# Patient Record
Sex: MALE | Race: WHITE | NOT HISPANIC OR LATINO | URBAN - METROPOLITAN AREA
[De-identification: names, ages, dates, MRNs, and addresses within clinical notes are randomized per-mention and may not be internally consistent; named-entity substitution may affect disease eponyms.]

---

## 2020-03-11 ENCOUNTER — OUTPATIENT (OUTPATIENT)
Dept: OUTPATIENT SERVICES | Facility: HOSPITAL | Age: 30
LOS: 1 days | End: 2020-03-11
Payer: COMMERCIAL

## 2020-03-11 VITALS
RESPIRATION RATE: 14 BRPM | HEIGHT: 68 IN | WEIGHT: 136.03 LBS | OXYGEN SATURATION: 99 % | DIASTOLIC BLOOD PRESSURE: 66 MMHG | HEART RATE: 49 BPM | SYSTOLIC BLOOD PRESSURE: 123 MMHG | TEMPERATURE: 98 F

## 2020-03-11 DIAGNOSIS — Z01.818 ENCOUNTER FOR OTHER PREPROCEDURAL EXAMINATION: ICD-10-CM

## 2020-03-11 DIAGNOSIS — Z98.890 OTHER SPECIFIED POSTPROCEDURAL STATES: Chronic | ICD-10-CM

## 2020-03-11 DIAGNOSIS — J34.2 DEVIATED NASAL SEPTUM: ICD-10-CM

## 2020-03-11 DIAGNOSIS — J32.0 CHRONIC MAXILLARY SINUSITIS: ICD-10-CM

## 2020-03-11 DIAGNOSIS — J34.3 HYPERTROPHY OF NASAL TURBINATES: ICD-10-CM

## 2020-03-11 LAB
ANION GAP SERPL CALC-SCNC: 6 MMOL/L — SIGNIFICANT CHANGE UP (ref 5–17)
APTT BLD: 29.2 SEC — SIGNIFICANT CHANGE UP (ref 28.5–37)
BUN SERPL-MCNC: 18 MG/DL — SIGNIFICANT CHANGE UP (ref 7–23)
CALCIUM SERPL-MCNC: 9.5 MG/DL — SIGNIFICANT CHANGE UP (ref 8.5–10.1)
CHLORIDE SERPL-SCNC: 110 MMOL/L — HIGH (ref 96–108)
CO2 SERPL-SCNC: 27 MMOL/L — SIGNIFICANT CHANGE UP (ref 22–31)
CREAT SERPL-MCNC: 0.97 MG/DL — SIGNIFICANT CHANGE UP (ref 0.5–1.3)
GLUCOSE SERPL-MCNC: 68 MG/DL — LOW (ref 70–99)
HCT VFR BLD CALC: 44.6 % — SIGNIFICANT CHANGE UP (ref 39–50)
HGB BLD-MCNC: 15.2 G/DL — SIGNIFICANT CHANGE UP (ref 13–17)
INR BLD: 1.06 RATIO — SIGNIFICANT CHANGE UP (ref 0.88–1.16)
MCHC RBC-ENTMCNC: 30.1 PG — SIGNIFICANT CHANGE UP (ref 27–34)
MCHC RBC-ENTMCNC: 34.1 GM/DL — SIGNIFICANT CHANGE UP (ref 32–36)
MCV RBC AUTO: 88.3 FL — SIGNIFICANT CHANGE UP (ref 80–100)
NRBC # BLD: 0 /100 WBCS — SIGNIFICANT CHANGE UP (ref 0–0)
PLATELET # BLD AUTO: 227 K/UL — SIGNIFICANT CHANGE UP (ref 150–400)
POTASSIUM SERPL-MCNC: 4.5 MMOL/L — SIGNIFICANT CHANGE UP (ref 3.5–5.3)
POTASSIUM SERPL-SCNC: 4.5 MMOL/L — SIGNIFICANT CHANGE UP (ref 3.5–5.3)
PROTHROM AB SERPL-ACNC: 11.9 SEC — SIGNIFICANT CHANGE UP (ref 10–12.9)
RBC # BLD: 5.05 M/UL — SIGNIFICANT CHANGE UP (ref 4.2–5.8)
RBC # FLD: 12.9 % — SIGNIFICANT CHANGE UP (ref 10.3–14.5)
SODIUM SERPL-SCNC: 143 MMOL/L — SIGNIFICANT CHANGE UP (ref 135–145)
WBC # BLD: 6.7 K/UL — SIGNIFICANT CHANGE UP (ref 3.8–10.5)
WBC # FLD AUTO: 6.7 K/UL — SIGNIFICANT CHANGE UP (ref 3.8–10.5)

## 2020-03-11 PROCEDURE — 85730 THROMBOPLASTIN TIME PARTIAL: CPT

## 2020-03-11 PROCEDURE — 85610 PROTHROMBIN TIME: CPT

## 2020-03-11 PROCEDURE — 36415 COLL VENOUS BLD VENIPUNCTURE: CPT

## 2020-03-11 PROCEDURE — G0463: CPT

## 2020-03-11 PROCEDURE — 85027 COMPLETE CBC AUTOMATED: CPT

## 2020-03-11 PROCEDURE — 80048 BASIC METABOLIC PNL TOTAL CA: CPT
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
## 2020-03-11 NOTE — H&P PST ADULT - NSICDXPASTSURGICALHX_GEN_ALL_CORE_FT
PAST SURGICAL HISTORY:  H/O shoulder surgery RIGHT Shoulder - 2011 - "shoulder Stabilization Surgery"

## 2020-03-11 NOTE — H&P PST ADULT - NSICDXPROBLEM_GEN_ALL_CORE_FT
PROBLEM DIAGNOSES  Problem: Deviated nasal septum  Assessment and Plan:     Problem: Chronic maxillary sinusitis  Assessment and Plan: See Above Assessment and Plan (#1)    Problem: Hypertrophy of nasal turbinates  Assessment and Plan: See Above Assessment and Plan (#1) PROBLEM DIAGNOSES  Problem: Deviated nasal septum  Assessment and Plan: PST Labs; CBC, BMP, Coags - No Medical Clearance needed - Pt instructed to stop any NSAIDS/Herbal Supplements/Multivitamin between now and procedure - he may take Tylenol if needed for pain between now and procedure - pre-op instructions given to pt with understanding verbalized     Problem: Chronic maxillary sinusitis  Assessment and Plan: See Above Assessment and Plan (#1)    Problem: Hypertrophy of nasal turbinates  Assessment and Plan: See Above Assessment and Plan (#1)

## 2020-03-11 NOTE — H&P PST ADULT - NSICDXFAMILYHX_GEN_ALL_CORE_FT
FAMILY HISTORY:  Family history of hypercholesterolemia, Father - Alive Age 58  Family hx of hypertension, Father - Alive Age 58

## 2021-07-19 ENCOUNTER — EMERGENCY (EMERGENCY)
Facility: HOSPITAL | Age: 31
LOS: 1 days | Discharge: HOME | End: 2021-07-19
Attending: EMERGENCY MEDICINE | Admitting: EMERGENCY MEDICINE
Payer: COMMERCIAL

## 2021-07-19 ENCOUNTER — TRANSCRIPTION ENCOUNTER (OUTPATIENT)
Age: 31
End: 2021-07-19

## 2021-07-19 VITALS
HEART RATE: 64 BPM | DIASTOLIC BLOOD PRESSURE: 60 MMHG | SYSTOLIC BLOOD PRESSURE: 120 MMHG | OXYGEN SATURATION: 100 % | RESPIRATION RATE: 16 BRPM

## 2021-07-19 VITALS
DIASTOLIC BLOOD PRESSURE: 77 MMHG | RESPIRATION RATE: 16 BRPM | HEIGHT: 68 IN | TEMPERATURE: 98 F | OXYGEN SATURATION: 100 % | HEART RATE: 63 BPM | SYSTOLIC BLOOD PRESSURE: 129 MMHG | WEIGHT: 132.06 LBS

## 2021-07-19 DIAGNOSIS — Z98.890 OTHER SPECIFIED POSTPROCEDURAL STATES: Chronic | ICD-10-CM

## 2021-07-19 PROBLEM — J34.2 DEVIATED NASAL SEPTUM: Chronic | Status: ACTIVE | Noted: 2020-03-11

## 2021-07-19 PROBLEM — J34.3 HYPERTROPHY OF NASAL TURBINATES: Chronic | Status: ACTIVE | Noted: 2020-03-11

## 2021-07-19 PROBLEM — J32.0 CHRONIC MAXILLARY SINUSITIS: Chronic | Status: ACTIVE | Noted: 2020-03-11

## 2021-07-19 PROBLEM — R00.1 BRADYCARDIA, UNSPECIFIED: Chronic | Status: ACTIVE | Noted: 2020-03-11

## 2021-07-19 PROBLEM — S43.006A UNSPECIFIED DISLOCATION OF UNSPECIFIED SHOULDER JOINT, INITIAL ENCOUNTER: Chronic | Status: ACTIVE | Noted: 2020-03-11

## 2021-07-19 LAB
ALBUMIN SERPL ELPH-MCNC: 4.1 G/DL — SIGNIFICANT CHANGE UP (ref 3.3–5)
ALP SERPL-CCNC: 63 U/L — SIGNIFICANT CHANGE UP (ref 40–120)
ALT FLD-CCNC: 42 U/L — SIGNIFICANT CHANGE UP (ref 12–78)
ANION GAP SERPL CALC-SCNC: 6 MMOL/L — SIGNIFICANT CHANGE UP (ref 5–17)
AST SERPL-CCNC: 23 U/L — SIGNIFICANT CHANGE UP (ref 15–37)
BASOPHILS # BLD AUTO: 0.03 K/UL — SIGNIFICANT CHANGE UP (ref 0–0.2)
BASOPHILS NFR BLD AUTO: 0.4 % — SIGNIFICANT CHANGE UP (ref 0–2)
BILIRUB SERPL-MCNC: 0.9 MG/DL — SIGNIFICANT CHANGE UP (ref 0.2–1.2)
BUN SERPL-MCNC: 13 MG/DL — SIGNIFICANT CHANGE UP (ref 7–23)
CALCIUM SERPL-MCNC: 9 MG/DL — SIGNIFICANT CHANGE UP (ref 8.5–10.1)
CHLORIDE SERPL-SCNC: 108 MMOL/L — SIGNIFICANT CHANGE UP (ref 96–108)
CO2 SERPL-SCNC: 28 MMOL/L — SIGNIFICANT CHANGE UP (ref 22–31)
CREAT SERPL-MCNC: 0.78 MG/DL — SIGNIFICANT CHANGE UP (ref 0.5–1.3)
EOSINOPHIL # BLD AUTO: 0.4 K/UL — SIGNIFICANT CHANGE UP (ref 0–0.5)
EOSINOPHIL NFR BLD AUTO: 5 % — SIGNIFICANT CHANGE UP (ref 0–6)
GLUCOSE SERPL-MCNC: 79 MG/DL — SIGNIFICANT CHANGE UP (ref 70–99)
HCT VFR BLD CALC: 46.5 % — SIGNIFICANT CHANGE UP (ref 39–50)
HGB BLD-MCNC: 15.5 G/DL — SIGNIFICANT CHANGE UP (ref 13–17)
IMM GRANULOCYTES NFR BLD AUTO: 0.1 % — SIGNIFICANT CHANGE UP (ref 0–1.5)
LACTATE SERPL-SCNC: 1.3 MMOL/L — SIGNIFICANT CHANGE UP (ref 0.7–2)
LYMPHOCYTES # BLD AUTO: 2.5 K/UL — SIGNIFICANT CHANGE UP (ref 1–3.3)
LYMPHOCYTES # BLD AUTO: 31.2 % — SIGNIFICANT CHANGE UP (ref 13–44)
MCHC RBC-ENTMCNC: 30 PG — SIGNIFICANT CHANGE UP (ref 27–34)
MCHC RBC-ENTMCNC: 33.3 GM/DL — SIGNIFICANT CHANGE UP (ref 32–36)
MCV RBC AUTO: 89.9 FL — SIGNIFICANT CHANGE UP (ref 80–100)
MONOCYTES # BLD AUTO: 0.53 K/UL — SIGNIFICANT CHANGE UP (ref 0–0.9)
MONOCYTES NFR BLD AUTO: 6.6 % — SIGNIFICANT CHANGE UP (ref 2–14)
NEUTROPHILS # BLD AUTO: 4.54 K/UL — SIGNIFICANT CHANGE UP (ref 1.8–7.4)
NEUTROPHILS NFR BLD AUTO: 56.7 % — SIGNIFICANT CHANGE UP (ref 43–77)
NRBC # BLD: 0 /100 WBCS — SIGNIFICANT CHANGE UP (ref 0–0)
PLATELET # BLD AUTO: 189 K/UL — SIGNIFICANT CHANGE UP (ref 150–400)
POTASSIUM SERPL-MCNC: 3.9 MMOL/L — SIGNIFICANT CHANGE UP (ref 3.5–5.3)
POTASSIUM SERPL-SCNC: 3.9 MMOL/L — SIGNIFICANT CHANGE UP (ref 3.5–5.3)
PROT SERPL-MCNC: 7.8 G/DL — SIGNIFICANT CHANGE UP (ref 6–8.3)
RBC # BLD: 5.17 M/UL — SIGNIFICANT CHANGE UP (ref 4.2–5.8)
RBC # FLD: 13 % — SIGNIFICANT CHANGE UP (ref 10.3–14.5)
SODIUM SERPL-SCNC: 142 MMOL/L — SIGNIFICANT CHANGE UP (ref 135–145)
WBC # BLD: 8.01 K/UL — SIGNIFICANT CHANGE UP (ref 3.8–10.5)
WBC # FLD AUTO: 8.01 K/UL — SIGNIFICANT CHANGE UP (ref 3.8–10.5)

## 2021-07-19 PROCEDURE — 85025 COMPLETE CBC W/AUTO DIFF WBC: CPT

## 2021-07-19 PROCEDURE — 87040 BLOOD CULTURE FOR BACTERIA: CPT

## 2021-07-19 PROCEDURE — 80053 COMPREHEN METABOLIC PANEL: CPT

## 2021-07-19 PROCEDURE — 99284 EMERGENCY DEPT VISIT MOD MDM: CPT

## 2021-07-19 PROCEDURE — 96374 THER/PROPH/DIAG INJ IV PUSH: CPT

## 2021-07-19 PROCEDURE — 36415 COLL VENOUS BLD VENIPUNCTURE: CPT

## 2021-07-19 PROCEDURE — 99284 EMERGENCY DEPT VISIT MOD MDM: CPT | Mod: 25

## 2021-07-19 PROCEDURE — 83605 ASSAY OF LACTIC ACID: CPT

## 2021-07-19 RX ORDER — PIPERACILLIN AND TAZOBACTAM 4; .5 G/20ML; G/20ML
3.38 INJECTION, POWDER, LYOPHILIZED, FOR SOLUTION INTRAVENOUS ONCE
Refills: 0 | Status: COMPLETED | OUTPATIENT
Start: 2021-07-19 | End: 2021-07-19

## 2021-07-19 RX ORDER — AZTREONAM 2 G
1 VIAL (EA) INJECTION
Qty: 6 | Refills: 0
Start: 2021-07-19 | End: 2021-07-21

## 2021-07-19 RX ADMIN — PIPERACILLIN AND TAZOBACTAM 200 GRAM(S): 4; .5 INJECTION, POWDER, LYOPHILIZED, FOR SOLUTION INTRAVENOUS at 14:35

## 2021-07-19 NOTE — ED PROVIDER NOTE - PATIENT PORTAL LINK FT
You can access the FollowMyHealth Patient Portal offered by [facility] by registering at the following website: [email or web address]. By joining [employer]’s FollowMyHealth portal, you will also be able to view your health information using other applications (apps) compatible with our system.

## 2021-07-19 NOTE — ED PROVIDER NOTE - ATTENDING CONTRIBUTION TO CARE
32 yo M p/w recent poison ivy to R knee. Pt states was healing well , now over past couple days, increased redness and discomfort to knee. Now with some feeling of prox spread. No fever/chills. no weakness / dizziness. no numbness / focal weakness. No pain to knee, no issues with moving knee / leg. No known covid exposures. no agg/allev factors. no other inj or co.  exam: MM Moist.  neck supple. no meningeal signs. Non-toxic, well appearing. R ant knee with diffuse erythema / warmth with some spread to distal upper leg. No LA palpated. Nl dist str/sens equal bl, FROM knee with NO pain. Nl cap refill, 2+ pulses. no other acute findings.  check labs, IV abx, outpt fu

## 2021-07-19 NOTE — ED PROVIDER NOTE - NSFOLLOWUPINSTRUCTIONS_ED_ALL_ED_FT
Cellulitis    Cellulitis is a skin infection caused by bacteria. This condition occurs most often in the arms and lower legs but can occur anywhere over the body. Symptoms include redness, swelling, warm skin, tenderness, and chills/fever. If you were prescribed an antibiotic medicine, take it as told by your health care provider. Do not stop taking the antibiotic even if you start to feel better.    SEEK IMMEDIATE MEDICAL CARE IF YOU HAVE ANY OF THE FOLLOWING SYMPTOMS: worsening fever, red streaks coming from affected area, vomiting or diarrhea, or dizziness/lightheadedness.      follow up with dermatologist and PMD  if condition worsens, or you develop fever return to ED

## 2021-07-19 NOTE — ED PROVIDER NOTE - PMH
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

## 2021-07-19 NOTE — ED ADULT NURSE NOTE - OBJECTIVE STATEMENT
Received the patient in the Er. patient is alert and oriented. Skin warm and dry. C/O redness and pain to right knee. Patient was on PO antibiotics.

## 2021-07-19 NOTE — ED PROVIDER NOTE - CLINICAL SUMMARY MEDICAL DECISION MAKING FREE TEXT BOX
right anterior knee cellulitis, hx of poison ivy 1 week ago, no fever, seen at urgent care today, prescribed abx , sent to ED for evaluation and iv abx, will obtain labs, give iv abx, follow up with dermatology and pmd

## 2021-07-19 NOTE — ED PROVIDER NOTE - CARE PROVIDER_API CALL
Valencia Duarte)  Internal Medicine  [address] [place] [address]  Phone: [phone]  Fax: [phone]  Follow Up Time: 4-6 Days

## 2021-07-19 NOTE — ED PROVIDER NOTE - CARE PLAN
Principal Discharge DX:	Poison ivy dermatitis  Secondary Diagnosis:	Cellulitis of right lower extremity

## 2021-07-19 NOTE — ED ADULT NURSE NOTE - NSIMPLEMENTINTERV_GEN_ALL_ED
Implemented All Universal Safety Interventions:  [place] to call system. Call bell, personal items and telephone within reach. Instruct patient to call for assistance. Room bathroom lighting operational. Non-slip footwear when patient is off stretcher. Physically safe environment: no spills, clutter or unnecessary equipment. Stretcher in lowest position, wheels locked, appropriate side rails in place.

## 2021-07-19 NOTE — ED PROVIDER NOTE - OBJECTIVE STATEMENT
31 y male presents with right knee cellulitis, with faint streaking and tenderness to right shin, and right thigh, no fever, states he had a case of poison ivy 1.5 weeks ago, seen at urgent care today, was sent to ED for evaluation, states he was prescribed po abx today but does not know the name.  nonsmoker, no etoh.  states no pmh, no PMD

## 2021-07-19 NOTE — ED PROVIDER NOTE - NSFOLLOWUPCLINICS_GEN_ALL_ED_FT
[facility] - [facility]  Dermatology  [address] [place] [address]  Phone: [phone]  Fax: [phone]  Established Patient  Follow Up Time: 1-3 Days

## 2021-07-19 NOTE — ED ADULT TRIAGE NOTE - CHIEF COMPLAINT QUOTE
"I had poison ivy and scratched it and I guess it got infected."  pt c/o wound and redness to right knee now extending up to groin, pt took one dose of oral antibiotics prescribed this morning by urgent care

## 2021-07-24 LAB
CULTURE RESULTS: SIGNIFICANT CHANGE UP
CULTURE RESULTS: SIGNIFICANT CHANGE UP
SPECIMEN SOURCE: SIGNIFICANT CHANGE UP
SPECIMEN SOURCE: SIGNIFICANT CHANGE UP

## 2022-01-11 ENCOUNTER — NON-APPOINTMENT (OUTPATIENT)
Age: 32
End: 2022-01-11

## 2022-01-11 ENCOUNTER — APPOINTMENT (OUTPATIENT)
Dept: ORTHOPEDIC SURGERY | Facility: CLINIC | Age: 32
End: 2022-01-11
Payer: COMMERCIAL

## 2022-01-11 PROCEDURE — 73564 X-RAY EXAM KNEE 4 OR MORE: CPT | Mod: LT

## 2022-01-11 PROCEDURE — 99204 OFFICE O/P NEW MOD 45 MIN: CPT

## 2022-01-11 NOTE — HISTORY OF PRESENT ILLNESS
[Stable] : stable [___ mths] : [unfilled] month(s) ago [3] : an average pain level of 3/10 [2] : a minimum pain level of 2/10 [4] : a maximum pain level of 4/10 [Bending] : worsened by bending [Running] : worsened by running [Physical Therapy] : relieved by physical therapy [Rest] : relieved by rest [de-identified] : CARLOTTA BEAUCHAMP is a 31 year male being seen for initial visit L knee pain. He reports he was previously being treated by another orthopedic for his knee pain until they stopped accepting his insurance. He reports he received a L knee cortisone injection in Sept 2021 that provided him 85% relief. He reports he also performed 4-6 weeks of physical therapy with some relief, and continues to perform his exercises at home. He reports he was set to receive a gel injection with his last doctor (Dr. Mariah William), but was unable to receive injection before they stopped accepting his insurance. Currently, he reports most pain after performing repetitive activity or bending. He reports most pain over proximal-lateral aspect of L patella. He reports relief with IT band stretching and massage. 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 \par Patient presents with MRI of L knee performed on 09/10/2021 at [facility]. MRI reveals:  mis hele mess\par \par 1. Few ganglion cysts at the proximal tibia and fibula articulation, consistent with degenerative change.\par 2. Subtle full-thickness cartilage fissure within the lateral patellar facet cartilage with associated subchondral edema.\par 3. Small Baker cyst.\par 4. Menisci and cruciate ligaments are intact.

## 2022-01-11 NOTE — ADDENDUM
[FreeTextEntry1] : Documented by Chad Enriquez acting as a scribe for Dr. Garcias on 01/11/2022. \par \par All medical record entries made by the Scribe were at my, Dr. Garcias's, direction and\par personally dictated by me on 01/11/2022. I have reviewed the chart and agree that the record\par accurately reflects my personal performance of the history, physical exam, procedure and imaging.

## 2022-01-11 NOTE — PHYSICAL EXAM
[de-identified] : Physical Exam:\par General: Well appearing, no acute distress, A&O\par Neurologic: A&Ox3, No focal deficits\par Head: NCAT without abrasions, lacerations, or ecchymosis to head, face, or scalp\par Eyes: No scleral icterus, no gross abnormalities\par Respiratory: Equal chest wall expansion bilaterally, no accessory muscle use\par Lymphatic: No lymphadenopathy palpated\par Skin: Warm and dry\par Psychiatric: Normal mood and affect\par \par Left Knee: Range of Motion in Degrees	\par 	  Claimant:	Normal:	\par Flexion Active	 135 	 135-degrees	\par Flexion Passive	 135	 135-degrees	\par Extension Active	 0-5	 0-5-degrees	\par Extension Passive	 0-5	 0-5-degrees	\par \par Tenderness over the tibial tubercle. No tenderness over the tendon at this time. No weakness to flexion/extension. Tenderness over the pes bursa. Small baker's cyst noted. No evidence of instability in the AP plane or varus or valgus stress. Negative Lachman. Negative pivot shift. Negative anterior drawer test. Negative posterior drawer test. Negative Adilson. Negative Apley grind. No medial or lateral joint line tenderness. No tenderness over the medial and lateral facet of the patella. No patellofemoral crepitations. No lateral tilting patella. No patella apprehension. No crepitation in the medial and lateral femoral condyle. No proximal or distal swelling, edema or tenderness. No gross motor or sensory deficits. No intra-articular swelling. Extra-articular swelling over the proximal medial aspect of the tibia. 2+ DP and PT pulses. No varus or valgus malalignment. Skin is intact. No rashes, scars or lesions. \par  \par Right Knee: Range of Motion in Degrees	\par 	  Claimant:	Normal:	\par Flexion Active	 135 	 135-degrees	\par Flexion Passive	 135	 135-degrees	\par Extension Active	 0-5	 0-5-degrees	\par Extension Passive	 0-5	 0-5-degrees	\par \par No weakness to flexion/extension. No evidence of instability in the AP plane or varus or valgus stress. Negative Lachman. Negative pivot shift. Negative anterior drawer test. Negative posterior drawer test. Negative Adilson. Negative Apley grind. No medial or lateral joint line tenderness. No tenderness over the medial and lateral facet of the patella. No patellofemoral crepitations. No lateral tilting patella. No patellar apprehension. No crepitation in the medial and lateral femoral condyle. No proximal or distal swelling, edema or tenderness. No gross motor or sensory deficits. No intra-articular swelling. 2+ DP and PT pulses. No varus or valgus malalignment. Skin is intact. No rashes, scars or lesions.  [de-identified] : 4 views of L knee were performed today and available for me to review. Results were discussed with the patient. They demonstrate patellar edema, mild to moderate PF joint space narrowing no f/x, dislocation or other deformity.\par \par DATE OF EXAM: 09/10/2021 - [facility]\par MRI-3T LEFT KNEE NON CONTRAST\par IMPRESSION:\par \par 1. Few ganglion cysts at the proximal tibia and fibula articulation, consistent with degenerative change.\par 2. Subtle full-thickness cartilage fissure within the lateral patellar facet cartilage with associated subchondral edema.\par 3. Small Baker cyst.\par 4. Menisci and cruciate ligaments are intact.

## 2022-01-11 NOTE — DISCUSSION/SUMMARY
[de-identified] : CARLOTTA is a 31 year male with Left knee pain secondary to IT band tendinitis and patellofemoral pain syndrome. I have explained to the patient the anatomy of the patellofemoral joint. It includes the extensor mechanism (quadriceps tendon, quadriceps muscles, patella, patella tendon, and medial and lateral retinaculum). I have shown the patient that the articular cartilage gets a little softened. This is what is known as chondromalacia. If one theoretically were to remove or scrape all the articular cartilage, it would be identified as arthritis. Somewhat paradoxically, however, chondromalacia does not necessarily lead to arthritis or at least there is no evidence irrefutable at this point in time.\par \par Chondromalacia or anterior knee pain is a syndrome that hurts the patients more than it causes destruction to the knee; thus pain is not associated with destruction. Likewise, noises, cracking, and popping for the most part are not anymore significant than people cracking their knuckles. It is certainly not a sign of damage to the joint.\par \par Over 90% of the people with patellofemoral problems will get better if they understand the weight bearing stresses that are applied to the patellofemoral joint. The forces can range from 2 to 9 times your body weight per step and with abnormal alignment the average is usually higher. \par \par The treatment is to minimize weight-bearing stress and to strengthen the surrounding muscles. From a practical point of view, one can divide their lifestyle into activities of daily living, conditioning, and recreation. In activities of daily living one should feel free to walk around as necessary but only for functioning. If one has an option between stairs and an escalator, the latter is preferable.\par \par The conditioning aspect should be a non weight bearing program which is best achieved by bicycle riding at least 3 to 4 days a week. It should be done with increasing resistance for at least a half hour. The seat of the bike should always be kept at a position so that the knee stays within a 0 to 90 degree arc. This will avoid hyperextension and flexion beyond 90 degrees, which tends to aggravate symptoms of discomfort. Leg extension for stretching exercises are similarly kept within this arc of motion. Step machines are not advised as they tend to aggravate patellofemoral symptoms as do squats. A [employer] machine is an alternative that is usually acceptable.\par \par The recreational part of their life is based on the fact that since pain is not leading to destruction, we will compromise and allow a recreational lifestyle. If indeed activity, albeit associated with impact does not yield any symptoms, they should feel free to participate. If an increase in activities is associated with increasing discomfort, the patient should use "common sense" and cut back on the level of activity. Recreation, however, is never to be used for conditioning even in an asymptomatic patient. The patient must always maintain a conditioning program. I think the patient understands this explanation.\par \par While over 90% of the people with this syndrome will do well non-operatively, some conscientious patients will still have symptoms. If so, they should be reevaluated to ascertain if they fall into a small category of people who require physical therapy or surgery. I also advised him about hip, lower body mobility exercises. \par \par We ordered Euflexxa and Zilretta to see which would be approved by patients' insurance. Pt understands Euflexxa is a three part series and he would have to come in in 1 week intervals over a 3 week timeframe, if approved. He also understands this due to his insurance requires prior authorization. We will call pt when and if approved. If he does not hear from us in 2 weeks he should give his insurance company a call and discuss approval or denial and then get in contact with us. \par \par \par

## 2022-02-23 ENCOUNTER — OUTPATIENT (OUTPATIENT)
Dept: OUTPATIENT SERVICES | Facility: HOSPITAL | Age: 32
LOS: 1 days | End: 2022-02-23
Payer: COMMERCIAL

## 2022-02-23 VITALS
SYSTOLIC BLOOD PRESSURE: 131 MMHG | WEIGHT: 130.95 LBS | HEART RATE: 55 BPM | HEIGHT: 68 IN | DIASTOLIC BLOOD PRESSURE: 75 MMHG | TEMPERATURE: 98 F | RESPIRATION RATE: 17 BRPM | OXYGEN SATURATION: 99 %

## 2022-02-23 DIAGNOSIS — J34.3 HYPERTROPHY OF NASAL TURBINATES: ICD-10-CM

## 2022-02-23 DIAGNOSIS — Z01.818 ENCOUNTER FOR OTHER PREPROCEDURAL EXAMINATION: ICD-10-CM

## 2022-02-23 DIAGNOSIS — J34.2 DEVIATED NASAL SEPTUM: ICD-10-CM

## 2022-02-23 DIAGNOSIS — Z98.890 OTHER SPECIFIED POSTPROCEDURAL STATES: Chronic | ICD-10-CM

## 2022-02-23 LAB
APTT BLD: 28 SEC — SIGNIFICANT CHANGE UP (ref 27.5–35.5)
HCT VFR BLD CALC: 44.4 % — SIGNIFICANT CHANGE UP (ref 39–50)
HGB BLD-MCNC: 15.7 G/DL — SIGNIFICANT CHANGE UP (ref 13–17)
INR BLD: 1.02 RATIO — SIGNIFICANT CHANGE UP (ref 0.88–1.16)
MCHC RBC-ENTMCNC: 30.4 PG — SIGNIFICANT CHANGE UP (ref 27–34)
MCHC RBC-ENTMCNC: 35.4 GM/DL — SIGNIFICANT CHANGE UP (ref 32–36)
MCV RBC AUTO: 85.9 FL — SIGNIFICANT CHANGE UP (ref 80–100)
NRBC # BLD: 0 /100 WBCS — SIGNIFICANT CHANGE UP (ref 0–0)
PLATELET # BLD AUTO: 213 K/UL — SIGNIFICANT CHANGE UP (ref 150–400)
PROTHROM AB SERPL-ACNC: 11.9 SEC — SIGNIFICANT CHANGE UP (ref 10.6–13.6)
RBC # BLD: 5.17 M/UL — SIGNIFICANT CHANGE UP (ref 4.2–5.8)
RBC # FLD: 13.2 % — SIGNIFICANT CHANGE UP (ref 10.3–14.5)
WBC # BLD: 6.59 K/UL — SIGNIFICANT CHANGE UP (ref 3.8–10.5)
WBC # FLD AUTO: 6.59 K/UL — SIGNIFICANT CHANGE UP (ref 3.8–10.5)

## 2022-02-23 PROCEDURE — 85610 PROTHROMBIN TIME: CPT

## 2022-02-23 PROCEDURE — 85730 THROMBOPLASTIN TIME PARTIAL: CPT

## 2022-02-23 PROCEDURE — 85027 COMPLETE CBC AUTOMATED: CPT

## 2022-02-23 PROCEDURE — G0463: CPT

## 2022-02-23 PROCEDURE — 36415 COLL VENOUS BLD VENIPUNCTURE: CPT

## 2022-02-23 NOTE — H&P PST ADULT - FALL HARM RISK - RISK INTERVENTIONS

## 2022-02-23 NOTE — H&P PST ADULT - NSANTHOSAYNRD_GEN_A_CORE
No. JALEESA screening performed.  STOP BANG Legend: 0-2 = LOW Risk; 3-4 = INTERMEDIATE Risk; 5-8 = HIGH Risk

## 2022-02-23 NOTE — H&P PST ADULT - PROBLEM SELECTOR PLAN 1
PST: CBC, PT/INR PTT   No medical evaluation needed   All preoperative instructions reviewed with patient who verbalized understanding.   Avoid all NSAID/ASA containing products as well as all herbal/vitamin supplements. Tylenol only for pain/headache.   Covid swab at [place] date TBD. Pt verbalized understanding.   Fully vaccinated; Denies recent international travel, recent illness and sick contact with COVID in the last 3 weeks

## 2022-02-23 NOTE — H&P PST ADULT - ASSESSMENT
This 32 yo male with no significant PMHX  presents to PST for a scheduled  septoplasty bilateral submucous resection turbinates with Dr Charles  on 3/2/22

## 2022-02-23 NOTE — H&P PST ADULT - HISTORY OF PRESENT ILLNESS
This 32 yo male with no significant PMHX  presents to PST for a scheduled  septoplasty bilateral submucous resection turbinates with Dr Charles  on 3/2/22  . Pt is electing to have this procedure.

## 2022-02-28 ENCOUNTER — OUTPATIENT (OUTPATIENT)
Dept: OUTPATIENT SERVICES | Facility: HOSPITAL | Age: 32
LOS: 1 days | End: 2022-02-28
Payer: COMMERCIAL

## 2022-02-28 DIAGNOSIS — Z20.828 CONTACT WITH AND (SUSPECTED) EXPOSURE TO OTHER VIRAL COMMUNICABLE DISEASES: ICD-10-CM

## 2022-02-28 DIAGNOSIS — Z98.890 OTHER SPECIFIED POSTPROCEDURAL STATES: Chronic | ICD-10-CM

## 2022-02-28 LAB — SARS-COV-2 RNA SPEC QL NAA+PROBE: SIGNIFICANT CHANGE UP

## 2022-02-28 PROCEDURE — U0003: CPT

## 2022-02-28 PROCEDURE — U0005: CPT

## 2022-03-01 ENCOUNTER — TRANSCRIPTION ENCOUNTER (OUTPATIENT)
Age: 32
End: 2022-03-01

## 2022-03-01 ENCOUNTER — APPOINTMENT (OUTPATIENT)
Dept: ORTHOPEDIC SURGERY | Facility: CLINIC | Age: 32
End: 2022-03-01
Payer: COMMERCIAL

## 2022-03-01 VITALS
HEIGHT: 69 IN | HEART RATE: 74 BPM | WEIGHT: 135 LBS | SYSTOLIC BLOOD PRESSURE: 111 MMHG | BODY MASS INDEX: 19.99 KG/M2 | DIASTOLIC BLOOD PRESSURE: 62 MMHG

## 2022-03-01 PROCEDURE — 20610 DRAIN/INJ JOINT/BURSA W/O US: CPT | Mod: LT

## 2022-03-01 NOTE — ASU PATIENT PROFILE, ADULT - FALL HARM RISK - RISK INTERVENTIONS

## 2022-03-02 ENCOUNTER — RESULT REVIEW (OUTPATIENT)
Age: 32
End: 2022-03-02

## 2022-03-02 ENCOUNTER — OUTPATIENT (OUTPATIENT)
Dept: OUTPATIENT SERVICES | Facility: HOSPITAL | Age: 32
LOS: 1 days | End: 2022-03-02
Payer: COMMERCIAL

## 2022-03-02 VITALS
RESPIRATION RATE: 16 BRPM | SYSTOLIC BLOOD PRESSURE: 122 MMHG | HEART RATE: 60 BPM | TEMPERATURE: 98 F | DIASTOLIC BLOOD PRESSURE: 70 MMHG | OXYGEN SATURATION: 98 % | WEIGHT: 134.92 LBS | HEIGHT: 68 IN

## 2022-03-02 VITALS
SYSTOLIC BLOOD PRESSURE: 135 MMHG | OXYGEN SATURATION: 97 % | RESPIRATION RATE: 16 BRPM | HEART RATE: 55 BPM | DIASTOLIC BLOOD PRESSURE: 83 MMHG

## 2022-03-02 DIAGNOSIS — J34.3 HYPERTROPHY OF NASAL TURBINATES: ICD-10-CM

## 2022-03-02 DIAGNOSIS — Z01.818 ENCOUNTER FOR OTHER PREPROCEDURAL EXAMINATION: ICD-10-CM

## 2022-03-02 DIAGNOSIS — Z98.890 OTHER SPECIFIED POSTPROCEDURAL STATES: Chronic | ICD-10-CM

## 2022-03-02 DIAGNOSIS — J34.2 DEVIATED NASAL SEPTUM: ICD-10-CM

## 2022-03-02 PROCEDURE — 88304 TISSUE EXAM BY PATHOLOGIST: CPT | Mod: 26

## 2022-03-02 PROCEDURE — C1889: CPT

## 2022-03-02 PROCEDURE — 88311 DECALCIFY TISSUE: CPT | Mod: 26

## 2022-03-02 PROCEDURE — 88304 TISSUE EXAM BY PATHOLOGIST: CPT

## 2022-03-02 PROCEDURE — 88311 DECALCIFY TISSUE: CPT

## 2022-03-02 PROCEDURE — 30140 RESECT INFERIOR TURBINATE: CPT | Mod: 50

## 2022-03-02 PROCEDURE — 88300 SURGICAL PATH GROSS: CPT

## 2022-03-02 PROCEDURE — 88300 SURGICAL PATH GROSS: CPT | Mod: 26,59

## 2022-03-02 PROCEDURE — 30520 REPAIR OF NASAL SEPTUM: CPT

## 2022-03-02 DEVICE — PACKING NASAL MEROGEL 4X4CM: Type: IMPLANTABLE DEVICE | Status: FUNCTIONAL

## 2022-03-02 DEVICE — SHEET SILICONE .040-1.02 MM: Type: IMPLANTABLE DEVICE | Status: FUNCTIONAL

## 2022-03-02 RX ORDER — HYDROMORPHONE HYDROCHLORIDE 2 MG/ML
1 INJECTION INTRAMUSCULAR; INTRAVENOUS; SUBCUTANEOUS
Refills: 0 | Status: DISCONTINUED | OUTPATIENT
Start: 2022-03-02 | End: 2022-03-02

## 2022-03-02 RX ORDER — OXYCODONE AND ACETAMINOPHEN 5; 325 MG/1; MG/1
1 TABLET ORAL
Qty: 28 | Refills: 0
Start: 2022-03-02 | End: 2022-03-08

## 2022-03-02 RX ORDER — SODIUM CHLORIDE 9 MG/ML
1000 INJECTION, SOLUTION INTRAVENOUS
Refills: 0 | Status: DISCONTINUED | OUTPATIENT
Start: 2022-03-02 | End: 2022-03-02

## 2022-03-02 RX ORDER — HYDROMORPHONE HYDROCHLORIDE 2 MG/ML
0.5 INJECTION INTRAMUSCULAR; INTRAVENOUS; SUBCUTANEOUS
Refills: 0 | Status: DISCONTINUED | OUTPATIENT
Start: 2022-03-02 | End: 2022-03-02

## 2022-03-02 RX ORDER — CEPHALEXIN 500 MG
1 CAPSULE ORAL
Qty: 20 | Refills: 0
Start: 2022-03-02 | End: 2022-03-11

## 2022-03-02 RX ORDER — ONDANSETRON 8 MG/1
4 TABLET, FILM COATED ORAL ONCE
Refills: 0 | Status: DISCONTINUED | OUTPATIENT
Start: 2022-03-02 | End: 2022-03-02

## 2022-03-02 RX ADMIN — SODIUM CHLORIDE 75 MILLILITER(S): 9 INJECTION, SOLUTION INTRAVENOUS at 06:43

## 2022-03-02 NOTE — BRIEF OPERATIVE NOTE - NSICDXBRIEFPROCEDURE_GEN_ALL_CORE_FT
PROCEDURES:  Nasal septoplasty 02-Mar-2022 09:57:10  Rodrigo Charles  Nasal septoplasty with turbinectomy 02-Mar-2022 09:57:33  Rodrigo Charles

## 2022-03-02 NOTE — ASU DISCHARGE PLAN (ADULT/PEDIATRIC) - CARE PROVIDER_API CALL
Rodrigo Charles)  Otolaryngology  [address] [place] [address]  Phone: [phone]  Fax: [phone]  Follow Up Time:

## 2022-03-02 NOTE — ASU DISCHARGE PLAN (ADULT/PEDIATRIC) - NS MD DC FALL RISK RISK
For information on Fall & Injury Prevention, visit: [email or web address] OR  [email or web address] OR  https://www.cdc.gov/steadi/patient.html

## 2022-03-02 NOTE — PRE-OP CHECKLIST - LAST TOOK
Quality 224: Stage 0-Iic Melanoma: Overutilization Of Imaging Studies For Only Stage 0-Iic Melanoma: None of the following diagnostic imaging studies ordered: chest X-ray, CT, Ultrasound, MRI, PET, or nuclear medicine scans (ML)
Quality 137: Melanoma: Continuity Of Care - Recall System: Patient information entered into a recall system that includes: target date for the next exam specified AND a process to follow up with patients regarding missed or unscheduled appointments
Detail Level: Detailed
solids

## 2022-03-02 NOTE — BRIEF OPERATIVE NOTE - NSICDXBRIEFPREOP_GEN_ALL_CORE_FT
PRE-OP DIAGNOSIS:  Nasal septal deviation 02-Mar-2022 09:57:46  Rodrigo Charles  Nasal turbinate hypertrophy 02-Mar-2022 09:58:06  Rodrigo Charles

## 2022-03-02 NOTE — BRIEF OPERATIVE NOTE - NSICDXBRIEFPOSTOP_GEN_ALL_CORE_FT
POST-OP DIAGNOSIS:  Nasal septal deviation 02-Mar-2022 09:58:21  Rodrigo Charles  Nasal turbinate hypertrophy 02-Mar-2022 09:58:37  Rodrigo Charles

## 2022-03-03 LAB — SURGICAL PATHOLOGY STUDY: SIGNIFICANT CHANGE UP

## 2022-03-03 NOTE — HISTORY OF PRESENT ILLNESS
[Worsening] : worsening [___ mths] : [unfilled] month(s) ago [2] : a current pain level of 2/10 [3] : an average pain level of 3/10 [de-identified] : CARLOTTA BEAUCHAMP is a 31 year y/o male who presents for Left knee Euflexxa injection (1/3).

## 2022-03-03 NOTE — DISCUSSION/SUMMARY
[de-identified] : CARLOTTA comes in for followup of his Left knee pain. He has had no relief with physical therapy and other conservative measures. At last visit given his symptoms as well as his radiographic findings I recommended Euflexxa injections of which he comes in today for his first out of three injections. The patient tolerated the procedure well. He already has his next two appointments scheduled and will see us 1 week from today. Starting in 2 days he should use a stationary bike for 5-10 minutes per day without resistance and is to take it easy for 3-5 weeks after the last injection. He demonstrates good understanding of plan and all questions were answered.

## 2022-03-03 NOTE — PHYSICAL EXAM
[de-identified] : Physical Exam:\par General: Well appearing, no acute distress, A&O\par Neurologic: A&Ox3, No focal deficits\par Head: NCAT without abrasions, lacerations, or ecchymosis to head, face, or scalp\par Eyes: No scleral icterus, no gross abnormalities\par Respiratory: Equal chest wall expansion bilaterally, no accessory muscle use\par Lymphatic: No lymphadenopathy palpated\par Skin: Warm and dry\par Psychiatric: Normal mood and affect\par \par Left Knee: Range of Motion in Degrees	\par 	 Claimant:	Normal:	\par Flexion Active	 135 	 135-degrees	\par Flexion Passive	 135	 135-degrees	\par Extension Active	 0-5	 0-5-degrees	\par Extension Passive	 0-5	 0-5-degrees	\par \par Tenderness over the tibial tubercle. No tenderness over the tendon at this time. No weakness to flexion/extension. Tenderness over the pes bursa. Small baker's cyst noted. No evidence of instability in the AP plane or varus or valgus stress. Negative Lachman. Negative pivot shift. Negative anterior drawer test. Negative posterior drawer test. Negative Adilson. Negative Apley grind. No medial or lateral joint line tenderness. No tenderness over the medial and lateral facet of the patella. No patellofemoral crepitations. No lateral tilting patella. No patella apprehension. No crepitation in the medial and lateral femoral condyle. No proximal or distal swelling, edema or tenderness. No gross motor or sensory deficits. No intra-articular swelling. Extra-articular swelling over the proximal medial aspect of the tibia. 2+ DP and PT pulses. No varus or valgus malalignment. Skin is intact. No rashes, scars or lesions. \par  \par Right Knee: Range of Motion in Degrees	\par 	 Claimant:	Normal:	\par Flexion Active	 135 	 135-degrees	\par Flexion Passive	 135	 135-degrees	\par Extension Active	 0-5	 0-5-degrees	\par Extension Passive	 0-5	 0-5-degrees	\par \par No weakness to flexion/extension. No evidence of instability in the AP plane or varus or valgus stress. Negative Lachman. Negative pivot shift. Negative anterior drawer test. Negative posterior drawer test. Negative Adilson. Negative Apley grind. No medial or lateral joint line tenderness. No tenderness over the medial and lateral facet of the patella. No patellofemoral crepitations. No lateral tilting patella. No patellar apprehension. No crepitation in the medial and lateral femoral condyle. No proximal or distal swelling, edema or tenderness. No gross motor or sensory deficits. No intra-articular swelling. 2+ DP and PT pulses. No varus or valgus malalignment. Skin is intact. No rashes, scars or lesions. \par

## 2022-03-03 NOTE — PROCEDURE
[de-identified] : Injection: Left knee joint.\par Indication: chondromalacia \par \par A discussion was had with the patient regarding this procedure and all questions were answered. All risks, benefits and alternatives were discussed. These included but were not limited to bleeding, infection, and allergic reaction. Alcohol was used to clean the skin, and betadine was used to sterilize and prep the area in the supero-lateral aspect of the left knee. Ethyl chloride spray was then used as a topical anesthetic. A 20-gauge needle was used to inject 2 cc of Euflexxa into the left knee. A sterile bandage was then applied. The patient tolerated the procedure well and there were no complications.

## 2022-03-08 ENCOUNTER — APPOINTMENT (OUTPATIENT)
Dept: ORTHOPEDIC SURGERY | Facility: CLINIC | Age: 32
End: 2022-03-08
Payer: COMMERCIAL

## 2022-03-08 PROCEDURE — 20610 DRAIN/INJ JOINT/BURSA W/O US: CPT | Mod: LT

## 2022-03-09 NOTE — DISCUSSION/SUMMARY
[de-identified] : CARLOTTA comes in for followup of his Left knee pain. He has had no relief with physical therapy and other conservative measures. At last visit given his symptoms as well as his radiographic findings pt received their first Euflexxa injection of which he comes in today for his second out of three injections. The patient tolerated the procedure well. He already has his next appointment scheduled and will see us 1 week from today. Starting in 2 days he should use a stationary bike for 5-10 minutes per day without resistance and is to take it easy for 3-5 weeks after the last injection. He demonstrates good understanding of plan and all questions were answered.

## 2022-03-09 NOTE — PHYSICAL EXAM
[de-identified] : Physical Exam:\par General: Well appearing, no acute distress, A&O\par Neurologic: A&Ox3, No focal deficits\par Head: NCAT without abrasions, lacerations, or ecchymosis to head, face, or scalp\par Eyes: No scleral icterus, no gross abnormalities\par Respiratory: Equal chest wall expansion bilaterally, no accessory muscle use\par Lymphatic: No lymphadenopathy palpated\par Skin: Warm and dry\par Psychiatric: Normal mood and affect\par \par Left Knee: Range of Motion in Degrees	\par 	 Claimant:	Normal:	\par Flexion Active	 135 	 135-degrees	\par Flexion Passive	 135	 135-degrees	\par Extension Active	 0-5	 0-5-degrees	\par Extension Passive	 0-5	 0-5-degrees	\par \par Tenderness over the tibial tubercle. No tenderness over the tendon at this time. No weakness to flexion/extension. Tenderness over the pes bursa. Small baker's cyst noted. No evidence of instability in the AP plane or varus or valgus stress. Negative Lachman. Negative pivot shift. Negative anterior drawer test. Negative posterior drawer test. Negative Adilson. Negative Apley grind. No medial or lateral joint line tenderness. No tenderness over the medial and lateral facet of the patella. No patellofemoral crepitations. No lateral tilting patella. No patella apprehension. No crepitation in the medial and lateral femoral condyle. No proximal or distal swelling, edema or tenderness. No gross motor or sensory deficits. No intra-articular swelling. Extra-articular swelling over the proximal medial aspect of the tibia. 2+ DP and PT pulses. No varus or valgus malalignment. Skin is intact. No rashes, scars or lesions. \par  \par Right Knee: Range of Motion in Degrees	\par 	 Claimant:	Normal:	\par Flexion Active	 135 	 135-degrees	\par Flexion Passive	 135	 135-degrees	\par Extension Active	 0-5	 0-5-degrees	\par Extension Passive	 0-5	 0-5-degrees	\par \par No weakness to flexion/extension. No evidence of instability in the AP plane or varus or valgus stress. Negative Lachman. Negative pivot shift. Negative anterior drawer test. Negative posterior drawer test. Negative Adilson. Negative Apley grind. No medial or lateral joint line tenderness. No tenderness over the medial and lateral facet of the patella. No patellofemoral crepitations. No lateral tilting patella. No patellar apprehension. No crepitation in the medial and lateral femoral condyle. No proximal or distal swelling, edema or tenderness. No gross motor or sensory deficits. No intra-articular swelling. 2+ DP and PT pulses. No varus or valgus malalignment. Skin is intact. No rashes, scars or lesions. \par

## 2022-03-09 NOTE — ADDENDUM
[FreeTextEntry1] : I, Veda Navarro, acted solely as a scribe for Dr. Ernesto Garcias on 03/08/2022\par \par All medical record entries made by the Scribe were at my, Dr. Ernesto Garcias DO., direction and personally dictated by me on 03/08/2022 . I have personally reviewed the chart and agree that the record accurately reflects my personal performance of the history, physical exam, assessment, and plan.\par \par

## 2022-03-09 NOTE — HISTORY OF PRESENT ILLNESS
[Worsening] : worsening [___ mths] : [unfilled] month(s) ago [2] : a current pain level of 2/10 [3] : an average pain level of 3/10 [de-identified] : CARLOTTA BEAUCHAMP is a 31 year y/o male who presents for Left knee Euflexxa injection (2/3).

## 2022-03-09 NOTE — PROCEDURE
[de-identified] : Injection: Left knee joint.\par Indication: chondromalacia \par \par A discussion was had with the patient regarding this procedure and all questions were answered. All risks, benefits and alternatives were discussed. These included but were not limited to bleeding, infection, and allergic reaction. Alcohol was used to clean the skin, and betadine was used to sterilize and prep the area in the supero-lateral aspect of the left knee. Ethyl chloride spray was then used as a topical anesthetic. A 20-gauge needle was used to inject 2 cc of Euflexxa into the left knee. A sterile bandage was then applied. The patient tolerated the procedure well and there were no complications.

## 2022-03-15 ENCOUNTER — APPOINTMENT (OUTPATIENT)
Dept: ORTHOPEDIC SURGERY | Facility: CLINIC | Age: 32
End: 2022-03-15
Payer: COMMERCIAL

## 2022-03-15 DIAGNOSIS — M22.42 CHONDROMALACIA PATELLAE, LEFT KNEE: ICD-10-CM

## 2022-03-15 PROCEDURE — 20610 DRAIN/INJ JOINT/BURSA W/O US: CPT | Mod: LT

## 2022-03-15 NOTE — PHYSICAL EXAM
[de-identified] : Physical Exam:\par General: Well appearing, no acute distress, A&O\par Neurologic: A&Ox3, No focal deficits\par Head: NCAT without abrasions, lacerations, or ecchymosis to head, face, or scalp\par Eyes: No scleral icterus, no gross abnormalities\par Respiratory: Equal chest wall expansion bilaterally, no accessory muscle use\par Lymphatic: No lymphadenopathy palpated\par Skin: Warm and dry\par Psychiatric: Normal mood and affect\par \par Left Knee: Range of Motion in Degrees	\par 	 Claimant:	Normal:	\par Flexion Active	 135 	 135-degrees	\par Flexion Passive	 135	 135-degrees	\par Extension Active	 0-5	 0-5-degrees	\par Extension Passive	 0-5	 0-5-degrees	\par \par Tenderness over the tibial tubercle. No tenderness over the tendon at this time. No weakness to flexion/extension. Tenderness over the pes bursa. Small baker's cyst noted. No evidence of instability in the AP plane or varus or valgus stress. Negative Lachman. Negative pivot shift. Negative anterior drawer test. Negative posterior drawer test. Negative Adilson. Negative Apley grind. No medial or lateral joint line tenderness. No tenderness over the medial and lateral facet of the patella. No patellofemoral crepitations. No lateral tilting patella. No patella apprehension. No crepitation in the medial and lateral femoral condyle. No proximal or distal swelling, edema or tenderness. No gross motor or sensory deficits. No intra-articular swelling. Extra-articular swelling over the proximal medial aspect of the tibia. 2+ DP and PT pulses. No varus or valgus malalignment. Skin is intact. No rashes, scars or lesions. \par  \par Right Knee: Range of Motion in Degrees	\par 	 Claimant:	Normal:	\par Flexion Active	 135 	 135-degrees	\par Flexion Passive	 135	 135-degrees	\par Extension Active	 0-5	 0-5-degrees	\par Extension Passive	 0-5	 0-5-degrees	\par \par No weakness to flexion/extension. No evidence of instability in the AP plane or varus or valgus stress. Negative Lachman. Negative pivot shift. Negative anterior drawer test. Negative posterior drawer test. Negative Adilson. Negative Apley grind. No medial or lateral joint line tenderness. No tenderness over the medial and lateral facet of the patella. No patellofemoral crepitations. No lateral tilting patella. No patellar apprehension. No crepitation in the medial and lateral femoral condyle. No proximal or distal swelling, edema or tenderness. No gross motor or sensory deficits. No intra-articular swelling. 2+ DP and PT pulses. No varus or valgus malalignment. Skin is intact. No rashes, scars or lesions. \par

## 2022-03-15 NOTE — HISTORY OF PRESENT ILLNESS
[Worsening] : worsening [___ mths] : [unfilled] month(s) ago [2] : a current pain level of 2/10 [3] : an average pain level of 3/10 [de-identified] : CARLOTTA BEAUCHAMP is a 31 year y/o male who presents for Left knee Euflexxa injection (3/3).

## 2022-03-15 NOTE — PROCEDURE
[de-identified] : Injection: Left knee joint.\par Indication: chondromalacia \par \par A discussion was had with the patient regarding this procedure and all questions were answered. All risks, benefits and alternatives were discussed. These included but were not limited to bleeding, infection, and allergic reaction. Alcohol was used to clean the skin, and betadine was used to sterilize and prep the area in the supero-lateral aspect of the left knee. Ethyl chloride spray was then used as a topical anesthetic. A 20-gauge needle was used to inject 2 cc of Euflexxa into the left knee. A sterile bandage was then applied. The patient tolerated the procedure well and there were no complications.

## 2022-03-15 NOTE — ADDENDUM
[FreeTextEntry1] : Documented by Chad Enriquez acting as a scribe for Dr. Garcias on 03/15/2022. \par \par All medical record entries made by the Scribe were at my, Dr. Garcias's, direction and\par personally dictated by me on 03/15/2022. I have reviewed the chart and agree that the record\par accurately reflects my personal performance of the history, physical exam, procedure and imaging.

## 2022-03-15 NOTE — DISCUSSION/SUMMARY
[de-identified] : CARLOTTA comes in for followup of his Left knee pain. He has had no relief with physical therapy and other conservative measures. At last visit patient received their second Euflexxa injection of which he comes in today for the last injection in the series. The patient tolerated the procedure well. Starting in 2 days he should use a stationary bike for 5-10 minutes per day without resistance and is to take it easy for 3-5 weeks. We will see them back in 4-6 weeks for clinical reassessment. He demonstrates good understanding of plan and all questions were answered.

## 2022-03-21 ENCOUNTER — APPOINTMENT (OUTPATIENT)
Dept: OTOLARYNGOLOGY | Facility: CLINIC | Age: 32
End: 2022-03-21
Payer: COMMERCIAL

## 2022-03-21 VITALS
HEIGHT: 68 IN | SYSTOLIC BLOOD PRESSURE: 146 MMHG | WEIGHT: 135 LBS | HEART RATE: 67 BPM | DIASTOLIC BLOOD PRESSURE: 74 MMHG | BODY MASS INDEX: 20.46 KG/M2

## 2022-03-21 DIAGNOSIS — J34.89 OTHER SPECIFIED DISORDERS OF NOSE AND NASAL SINUSES: ICD-10-CM

## 2022-03-21 DIAGNOSIS — Z98.890 OTHER SPECIFIED POSTPROCEDURAL STATES: ICD-10-CM

## 2022-03-21 DIAGNOSIS — Z78.9 OTHER SPECIFIED HEALTH STATUS: ICD-10-CM

## 2022-03-21 PROCEDURE — 99203 OFFICE O/P NEW LOW 30 MIN: CPT

## 2022-03-21 RX ORDER — HYALURONATE SODIUM 20 MG/2 ML
20 SYRINGE (ML) INTRAARTICULAR
Qty: 1 | Refills: 0 | Status: DISCONTINUED | COMMUNITY
Start: 2022-01-11 | End: 2022-03-21

## 2022-03-21 NOTE — ASSESSMENT
[FreeTextEntry1] : Reviewed and reconciled medications, allergies, PMHx, PSHx, SocHx, FMHx. \par \par charles - positive \par \par Plan:\par use saline spray and see what happens - no surgery needed at the moment. use sinus rinse. FU 1 month

## 2022-03-21 NOTE — PHYSICAL EXAM
[Hearing Browning Test (Tuning Fork On Forehead)] : no lateralization of tone [Normal] : mucosa is normal [Midline] : trachea located in midline position [Hearing Loss Right Only] : normal [Hearing Loss Left Only] : normal [FreeTextEntry1] : right side open and clear still some swelling of the septum \par left side crusting present in left side because sutures are present there \par left side mucus on the turbs - suctioned out \par  [de-identified] : uvula javon barrera  [de-identified] : class 2

## 2022-03-21 NOTE — ADDENDUM
[FreeTextEntry1] : Documented by Thea Méndez acting as scribe for Dr. Romano on 03/21/2022. \par \par All Medical record entries made by the scribe were at my. Dr. Romano direction and personally dictated by me on 03/21/2022. I have reviewed the chart and agree that the record accurately reflects my personal performance of the history, physical exam, assessment and plan. I have also personally directed, reviewed, and agreed with the discharge instructions.

## 2022-03-21 NOTE — REVIEW OF SYSTEMS
[Post Nasal Drip] : post nasal drip [Nasal Congestion] : nasal congestion [Discolored Nasal Discharge] : discolored nasal discharge [Throat Dryness] : throat dryness [Throat Itching] : throat itching [Negative] : Heme/Lymph

## 2022-03-21 NOTE — HISTORY OF PRESENT ILLNESS
[de-identified] : The patient presents today for deviated septum. Pt reports 3 weeks ago he had septum and turb reduction done - everything looked good. Pt states getting the surgery done because he had right side restriction. Pt reports now on the left side he is feeling restriction, and every morning he has thick mucus discharge. Pt also reports getting PND.

## 2022-03-21 NOTE — REASON FOR VISIT
[Initial Evaluation] : an initial evaluation for [FreeTextEntry2] : Deviated septum/Second opinion on surgery

## 2022-04-18 ENCOUNTER — APPOINTMENT (OUTPATIENT)
Dept: OTOLARYNGOLOGY | Facility: CLINIC | Age: 32
End: 2022-04-18
Payer: COMMERCIAL

## 2022-04-18 ENCOUNTER — RESULT REVIEW (OUTPATIENT)
Age: 32
End: 2022-04-18

## 2022-04-18 VITALS
HEART RATE: 46 BPM | SYSTOLIC BLOOD PRESSURE: 155 MMHG | BODY MASS INDEX: 19.99 KG/M2 | DIASTOLIC BLOOD PRESSURE: 81 MMHG | HEIGHT: 69 IN | WEIGHT: 135 LBS

## 2022-04-18 DIAGNOSIS — J34.89 OTHER SPECIFIED DISORDERS OF NOSE AND NASAL SINUSES: ICD-10-CM

## 2022-04-18 DIAGNOSIS — J34.3 HYPERTROPHY OF NASAL TURBINATES: ICD-10-CM

## 2022-04-18 DIAGNOSIS — J34.2 DEVIATED NASAL SEPTUM: ICD-10-CM

## 2022-04-18 DIAGNOSIS — J31.0 CHRONIC RHINITIS: ICD-10-CM

## 2022-04-18 PROCEDURE — 31231 NASAL ENDOSCOPY DX: CPT

## 2022-04-18 PROCEDURE — 99215 OFFICE O/P EST HI 40 MIN: CPT | Mod: 25

## 2022-04-18 PROCEDURE — 70486 CT MAXILLOFACIAL W/O DYE: CPT

## 2022-04-18 RX ORDER — MULTIVITAMIN
TABLET ORAL
Refills: 0 | Status: ACTIVE | COMMUNITY

## 2022-04-18 NOTE — ASSESSMENT
[FreeTextEntry1] : Reviewed and reconciled medications, allergies, PMHx, PSHx, SocHx, FMHx. 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 Mini CT sinus - interpreted by Dr. Romano and reviewed and reviewed with the patient, pending radiologist review. use sinus cones or breathe right strips for racing. FU after surgery

## 2022-04-18 NOTE — PROCEDURE
[Recalcitrant Symptoms] : recalcitrant symptoms  [Anterior rhinoscopy insufficient to account for symptoms] : anterior rhinoscopy insufficient to account for symptoms [None] : none [FreeTextEntry6] : Procedure: Rigid Nasal Endoscopy: Risks, benefits, and alternatives of rigid endoscopy were explained to the patient. The patient gave oral consent to proceed. The rigid scope was inserted into the right nasal cavity. Endoscopy of the inferior and middle meatus was performed. No polyp, mass, or lesion was appreciated. Olfactory cleft was clear. Spheno-ethmoid recess clear. Nasopharynx was clear.stitches are gone, narrow at the level of the posteriorly of the inferior turb, anterior portion is resected, middle turb slightly enlarged, septal perforation left side, right side narrow at the very posterior at the inferior turb, anterior portion is resected, middle turb slightly enlarged. . The procedure was repeated on the contralateral side with similar findings.

## 2022-04-18 NOTE — PHYSICAL EXAM
[Normal] : external appearance is normal [Hearing Loss Right Only] : normal [Hearing Loss Left Only] : normal [FreeTextEntry1] : nasal valve collapse b/l - up higher on the left \par right side open - turb is gone, septum slightly over to the right anteriorly \par left side inferior turb is gone, slight deflection of the septum 2/3 of the way back

## 2022-04-18 NOTE — HISTORY OF PRESENT ILLNESS
[de-identified] : The patient presents with h/o septum and turb reduction 03/2022. The patient presents today for a follow up on nose. Pt reports left side is still obstructed especially when he exhales and on the right exhale is completely normal. Pt states on the right when he inhales his nose feels like its collapsing.

## 2022-04-18 NOTE — ADDENDUM
[FreeTextEntry1] : Documented by Thea Méndez acting as scribe for Dr. Romano on 04/18/2022. \par \par All Medical record entries made by the scribe were at my. Dr. Romano direction and personally dictated by me on 04/18/2022. I have reviewed the chart and agree that the record accurately reflects my personal performance of the history, physical exam, assessment and plan. I have also personally directed, reviewed, and agreed with the discharge instructions.

## 2022-05-24 NOTE — ED PROVIDER NOTE - [facility] ED SYMPTOMS POS
Additional Notes: Patient consent was obtained to proceed with the visit and recommended plan of care after discussion of all risks and benefits, including the risks of COVID-19 exposure. Detail Level: Simple PAIN/TENDERNESS

## 2022-07-04 ENCOUNTER — EMERGENCY (EMERGENCY)
Facility: HOSPITAL | Age: 32
LOS: 1 days | Discharge: HOME | End: 2022-07-04
Attending: EMERGENCY MEDICINE | Admitting: EMERGENCY MEDICINE
Payer: COMMERCIAL

## 2022-07-04 VITALS
RESPIRATION RATE: 18 BRPM | TEMPERATURE: 98 F | HEIGHT: 68 IN | DIASTOLIC BLOOD PRESSURE: 73 MMHG | WEIGHT: 138.01 LBS | HEART RATE: 66 BPM | SYSTOLIC BLOOD PRESSURE: 136 MMHG

## 2022-07-04 VITALS — TEMPERATURE: 98 F

## 2022-07-04 DIAGNOSIS — Z98.890 OTHER SPECIFIED POSTPROCEDURAL STATES: Chronic | ICD-10-CM

## 2022-07-04 LAB
ALBUMIN SERPL ELPH-MCNC: 3.9 G/DL — SIGNIFICANT CHANGE UP (ref 3.3–5)
ALP SERPL-CCNC: 80 U/L — SIGNIFICANT CHANGE UP (ref 40–120)
ALT FLD-CCNC: 35 U/L — SIGNIFICANT CHANGE UP (ref 12–78)
ANION GAP SERPL CALC-SCNC: 5 MMOL/L — SIGNIFICANT CHANGE UP (ref 5–17)
AST SERPL-CCNC: 27 U/L — SIGNIFICANT CHANGE UP (ref 15–37)
BASOPHILS # BLD AUTO: 0.04 K/UL — SIGNIFICANT CHANGE UP (ref 0–0.2)
BASOPHILS NFR BLD AUTO: 0.5 % — SIGNIFICANT CHANGE UP (ref 0–2)
BILIRUB SERPL-MCNC: 0.4 MG/DL — SIGNIFICANT CHANGE UP (ref 0.2–1.2)
BUN SERPL-MCNC: 9 MG/DL — SIGNIFICANT CHANGE UP (ref 7–23)
CALCIUM SERPL-MCNC: 8.9 MG/DL — SIGNIFICANT CHANGE UP (ref 8.5–10.1)
CHLORIDE SERPL-SCNC: 108 MMOL/L — SIGNIFICANT CHANGE UP (ref 96–108)
CO2 SERPL-SCNC: 29 MMOL/L — SIGNIFICANT CHANGE UP (ref 22–31)
CREAT SERPL-MCNC: 0.78 MG/DL — SIGNIFICANT CHANGE UP (ref 0.5–1.3)
EGFR: 122 ML/MIN/1.73M2 — SIGNIFICANT CHANGE UP
EOSINOPHIL # BLD AUTO: 0.18 K/UL — SIGNIFICANT CHANGE UP (ref 0–0.5)
EOSINOPHIL NFR BLD AUTO: 2.2 % — SIGNIFICANT CHANGE UP (ref 0–6)
FLUAV AG NPH QL: SIGNIFICANT CHANGE UP
FLUBV AG NPH QL: SIGNIFICANT CHANGE UP
GLUCOSE SERPL-MCNC: 96 MG/DL — SIGNIFICANT CHANGE UP (ref 70–99)
HCT VFR BLD CALC: 49.1 % — SIGNIFICANT CHANGE UP (ref 39–50)
HGB BLD-MCNC: 16 G/DL — SIGNIFICANT CHANGE UP (ref 13–17)
IMM GRANULOCYTES NFR BLD AUTO: 0.5 % — SIGNIFICANT CHANGE UP (ref 0–1.5)
LYMPHOCYTES # BLD AUTO: 1.31 K/UL — SIGNIFICANT CHANGE UP (ref 1–3.3)
LYMPHOCYTES # BLD AUTO: 15.9 % — SIGNIFICANT CHANGE UP (ref 13–44)
MCHC RBC-ENTMCNC: 29.4 PG — SIGNIFICANT CHANGE UP (ref 27–34)
MCHC RBC-ENTMCNC: 32.6 GM/DL — SIGNIFICANT CHANGE UP (ref 32–36)
MCV RBC AUTO: 90.3 FL — SIGNIFICANT CHANGE UP (ref 80–100)
MONOCYTES # BLD AUTO: 0.88 K/UL — SIGNIFICANT CHANGE UP (ref 0–0.9)
MONOCYTES NFR BLD AUTO: 10.7 % — SIGNIFICANT CHANGE UP (ref 2–14)
NEUTROPHILS # BLD AUTO: 5.81 K/UL — SIGNIFICANT CHANGE UP (ref 1.8–7.4)
NEUTROPHILS NFR BLD AUTO: 70.2 % — SIGNIFICANT CHANGE UP (ref 43–77)
NRBC # BLD: 0 /100 WBCS — SIGNIFICANT CHANGE UP (ref 0–0)
PLATELET # BLD AUTO: 205 K/UL — SIGNIFICANT CHANGE UP (ref 150–400)
POTASSIUM SERPL-MCNC: 4.4 MMOL/L — SIGNIFICANT CHANGE UP (ref 3.5–5.3)
POTASSIUM SERPL-SCNC: 4.4 MMOL/L — SIGNIFICANT CHANGE UP (ref 3.5–5.3)
PROT SERPL-MCNC: 7.4 G/DL — SIGNIFICANT CHANGE UP (ref 6–8.3)
RBC # BLD: 5.44 M/UL — SIGNIFICANT CHANGE UP (ref 4.2–5.8)
RBC # FLD: 13.9 % — SIGNIFICANT CHANGE UP (ref 10.3–14.5)
RSV RNA NPH QL NAA+NON-PROBE: SIGNIFICANT CHANGE UP
S PYO DNA THROAT QL NAA+PROBE: SIGNIFICANT CHANGE UP
SARS-COV-2 RNA SPEC QL NAA+PROBE: DETECTED
SODIUM SERPL-SCNC: 142 MMOL/L — SIGNIFICANT CHANGE UP (ref 135–145)
WBC # BLD: 8.26 K/UL — SIGNIFICANT CHANGE UP (ref 3.8–10.5)
WBC # FLD AUTO: 8.26 K/UL — SIGNIFICANT CHANGE UP (ref 3.8–10.5)

## 2022-07-04 PROCEDURE — 99284 EMERGENCY DEPT VISIT MOD MDM: CPT

## 2022-07-04 PROCEDURE — 87651 STREP A DNA AMP PROBE: CPT

## 2022-07-04 PROCEDURE — 96375 TX/PRO/DX INJ NEW DRUG ADDON: CPT

## 2022-07-04 PROCEDURE — 80053 COMPREHEN METABOLIC PANEL: CPT

## 2022-07-04 PROCEDURE — 87637 SARSCOV2&INF A&B&RSV AMP PRB: CPT

## 2022-07-04 PROCEDURE — 99285 EMERGENCY DEPT VISIT HI MDM: CPT | Mod: 25

## 2022-07-04 PROCEDURE — 36415 COLL VENOUS BLD VENIPUNCTURE: CPT

## 2022-07-04 PROCEDURE — 87798 DETECT AGENT NOS DNA AMP: CPT

## 2022-07-04 PROCEDURE — 96374 THER/PROPH/DIAG INJ IV PUSH: CPT

## 2022-07-04 PROCEDURE — 85025 COMPLETE CBC W/AUTO DIFF WBC: CPT

## 2022-07-04 RX ORDER — NIRMATRELVIR AND RITONAVIR 150-100 MG
3 KIT ORAL
Qty: 30 | Refills: 0
Start: 2022-07-04 | End: 2022-07-08

## 2022-07-04 RX ORDER — KETOROLAC TROMETHAMINE 30 MG/ML
15 SYRINGE (ML) INJECTION ONCE
Refills: 0 | Status: DISCONTINUED | OUTPATIENT
Start: 2022-07-04 | End: 2022-07-04

## 2022-07-04 RX ORDER — SODIUM CHLORIDE 9 MG/ML
1000 INJECTION INTRAMUSCULAR; INTRAVENOUS; SUBCUTANEOUS ONCE
Refills: 0 | Status: COMPLETED | OUTPATIENT
Start: 2022-07-04 | End: 2022-07-04

## 2022-07-04 RX ORDER — ACETAMINOPHEN 500 MG
1000 TABLET ORAL ONCE
Refills: 0 | Status: COMPLETED | OUTPATIENT
Start: 2022-07-04 | End: 2022-07-04

## 2022-07-04 RX ADMIN — Medication 400 MILLIGRAM(S): at 09:29

## 2022-07-04 RX ADMIN — Medication 1000 MILLIGRAM(S): at 10:00

## 2022-07-04 RX ADMIN — SODIUM CHLORIDE 1000 MILLILITER(S): 9 INJECTION INTRAMUSCULAR; INTRAVENOUS; SUBCUTANEOUS at 09:30

## 2022-07-04 RX ADMIN — Medication 1000 MILLIGRAM(S): at 09:30

## 2022-07-04 RX ADMIN — Medication 15 MILLIGRAM(S): at 10:00

## 2022-07-04 RX ADMIN — Medication 15 MILLIGRAM(S): at 09:29

## 2022-07-04 NOTE — ED PROVIDER NOTE - NSFOLLOWUPINSTRUCTIONS_ED_ALL_ED_FT
Quarantine    If you were exposed     Quarantine and stay away from others when you have been in close contact with someone who has COVID-19.      Isolate    If you are sick or test positive     Isolate when you are sick or when you have COVID-19, even if you don't have symptoms.      When to stay home    Calculating quarantine     The date of your exposure is considered day 0. Day 1 is the first full day after your last contact with a person who has had COVID-19. Stay home and away from other people for at least 5 days. Learn why CDC updated guidance for the general public.    IF YOU were exposed to COVID-19 and are NOT    up to date   IF YOU were exposed to COVID-19 and are NOT on COVID-19 vaccinations   • Quarantine for at least 5 days • Stay home   •Stay home and quarantine for at least 5 full days.      •Wear a well-fitting mask if you must be around others in your home.        • Do not travel.     • Get tested   •Even if you don't develop symptoms, get tested at least 5 days after you last had close contact with someone with COVID-19.        • After quarantine • Watch for symptoms   •Watch for symptoms until 10 days after you last had close contact with someone with COVID-19.      • Avoid travel   •It is best to avoid travel until a full 10 days after you last had close contact with someone with COVID-19.      • If you develop symptoms   • Isolate immediately and get tested. Continue to stay home until you know the results. Wear a well-fitting mask around others.        • Take precautions until day 10 • Wear a well-fitting mask   •Wear a well-fitting mask for 10 full days any time you are around others inside your home or in public. Do not go to places where you are unable to wear a well-fitting mask.        • If you must travel during days 6–10, take precautions.       • Avoid being around people who are more likely to get very sick from COVID-19.         IF YOU were exposed to COVID-19 and are    up to date   IF YOU were exposed to COVID-19 and are on COVID-19 vaccinations   • No quarantine   •You do not need to stay home unless you develop symptoms.      • Get tested   •Even if you don't develop symptoms, get tested at least 5 days after you last had close contact with someone with COVID-19.      • Watch for symptoms   •Watch for symptoms until 10 days after you last had close contact with someone with COVID-19.    • If you develop symptoms   • Isolate immediately and get tested. Continue to stay home until you know the results. Wear a well-fitting mask around others.        • Take precautions until day 10 • Wear a well-fitting mask   •Wear a well-fitting mask for 10 full days any time you are around others inside your home or in public. Do not go to places where you are unable to wear a well-fitting mask.        • Take precautions if traveling        • Avoid being around people who are more likely to get very sick from COVID-19.         IF YOU were exposed to COVID-19 and had confirmed COVID-19 within the past 90 days (you tested positive using a viral test)   • No quarantine   •You do not need to stay home unless you develop symptoms.      • Watch for symptoms   •Watch for symptoms until 10 days after you last had close contact with someone with COVID-19.    • If you develop symptoms   • Isolate immediately and get tested. Continue to stay home until you know the results. Wear a well-fitting mask around others.        • Take precautions until day 10 • Wear a well-fitting mask   •Wear a well-fitting mask for 10 full days any time you are around others inside your home or in public. Do not go to places where you are unable to wear a well-fitting mask.        • Take precautions if traveling        • Avoid being around people who are more likely to get very sick from COVID-19.         Calculating isolation     Day 0 is your first day of symptoms or a positive viral test. Day 1 is the first full day after your symptoms developed or your test specimen was collected. If you have COVID-19 or have symptoms, isolate for at least 5 days.    IF YOU tested positive for COVID-19 or have symptoms, regardless of vaccination status   • Stay home for at least 5 days   •Stay home for 5 days and isolate from others in your home.      •Wear a well-fitting mask if you must be around others in your home.      • Do not travel.       • Ending isolation if you had symptoms   • End isolation after 5 full days if you are fever-free for 24 hours (without the use of fever-reducing medication) and your symptoms are improving.      • Ending isolation if you did NOT have symptoms   • End isolation after at least 5 full days after your positive test.      • If you got very sick from COVID-19 or have a weakened immune system   •You should isolate for at least 10 days. Consult your doctor before ending isolation.      • Take precautions until day 10 • Wear a well-fitting mask   •Wear a well-fitting mask for 10 full days any time you are around others inside your home or in public. Do not go to places where you are unable to wear a well-fitting mask.      • Do not travel   • Do not travel until a full 10 days after your symptoms started or the date your positive test was taken if you had no symptoms.        • Avoid being around people who are more likely to get very sick from COVID-19.           Definitions    Exposure     Contact with someone infected with SARS-CoV-2, the virus that causes COVID-19, in a way that increases the likelihood of getting infected with the virus.    Close contact     A close contact is someone who was less than 6 feet away from an infected person (laboratory-confirmed or a clinical diagnosis) for a cumulative total of 15 minutes or more over a 24-hour period. For example, three individual 5-minute exposures for a total of 15 minutes. People who are exposed to someone with COVID-19 after they completed at least 5 days of isolation are not considered close contacts.      Quarantine    Quarantine is a strategy used to prevent transmission of COVID-19 by keeping people who have been in close contact with someone with COVID-19 apart from others.    Who does not need to quarantine?     If you had close contact with someone with COVID-19 and you are in one of the following groups, you do not need to quarantine.  •You are up to date with your COVID-19 vaccines.      •You had confirmed COVID-19 within the last 90 days (meaning you tested positive using a viral test).      If you are up to date with COVID-19 vaccines, you should wear a well-fitting mask around others for 10 days from the date of your last close contact with someone with COVID-19 (the date of last close contact is considered day 0). Get tested at least 5 days after you last had close contact with someone with COVID-19. If you test positive or develop COVID-19 symptoms, isolate from other people and follow recommendations in the Isolation section below. If you tested positive for COVID-19 with a viral test within the previous 90 days and subsequently recovered and remain without COVID-19 symptoms, you do not need to quarantine or get tested after close contact. You should wear a well-fitting mask around others for 10 days from the date of your last close contact with someone with COVID-19 (the date of last close contact is considered day 0). If you have COVID-19 symptoms, get tested and isolate from other people and follow recommendations in the Isolation section below.    Who should quarantine?    If you come into close contact with someone with COVID-19, you should quarantine if you are not up to date on COVID-19 vaccines. This includes people who are not vaccinated.    What to do for quarantine    •Stay home and away from other people for at least 5 days (day 0 through day 5) after your last contact with a person who has COVID-19. The date of your exposure is considered day 0. Wear a well-fitting mask when around others at home, if possible.      •For 10 days after your last close contact with someone with COVID-19, watch for fever (100.4°F or greater), cough, shortness of breath, or other COVID-19 symptoms.      •If you develop symptoms, get tested immediately and isolate until you receive your test results. If you test positive, follow isolation recommendations.    •If you do not develop symptoms, get tested at least 5 days after you last had close contact with someone with COVID-19.  •If you test negative, you can leave your home, but continue to wear a well-fitting mask when around others at home and in public until 10 days after your last close contact with someone with COVID-19.      •If you test positive, you should isolate for at least 5 days from the date of your positive test (if you do not have symptoms). If you do develop COVID-19 symptoms, isolate for at least 5 days from the date your symptoms began (the date the symptoms started is day 0). Follow recommendations in the isolation section below.      •If you are unable to get a test 5 days after last close contact with someone with COVID-19, you can leave your home after day 5 if you have been without COVID-19 symptoms throughout the 5-day period. Wear a well-fitting mask for 10 days after your date of last close contact when around others at home and in public.      •Avoid people who are have weakened immune systems or are more likely to get very sick from COVID-19, and nursing homes and other high-risk settings, until after at least 10 days.        •If possible, stay away from people you live with, especially people who are at higher risk for getting very sick from COVID-19, as well as others outside your home throughout the full 10 days after your last close contact with someone with COVID-19.      •If you are unable to quarantine, you should wear a well-fitting mask for 10 days when around others at home and in public.      •If you are unable to wear a mask when around others, you should continue to quarantine for 10 days. Avoid people who have weakened immune systems or are more likely to get very sick from COVID-19, and nursing homes and other high-risk settings, until after at least 10 days.      •See additional information about travel.      •Do not go to places where you are unable to wear a mask, such as restaurants and some gyms, and avoid eating around others at home and at work until after 10 days after your last close contact with someone with COVID-19.      After quarantine    •Watch for symptoms until 10 days after your last close contact with someone with COVID-19.      •If you have symptoms, isolate immediately and get tested.      Quarantine in high-risk congregate settings    In certain congregate settings that have high risk of secondary transmission (such as correctional and longterm facilities, homeless shelters, or cruise ships), CDC recommends a 10-day quarantine for residents, regardless of vaccination and booster status. During periods of critical staffing shortages, facilities may consider shortening the quarantine period for staff to ensure continuity of operations. Decisions to shorten quarantine in these settings should be made in consultation with state, local, [place], or territorial health departments and should take into consideration the context and characteristics of the facility. CDC's setting-specific guidance provides additional recommendations for these settings.      Isolation    Isolation is used to separate people with confirmed or suspected COVID-19 from those without COVID-19. People who are in isolation should stay home until it's safe for them to be around others. At home, anyone sick or infected should separate from others, or wear a well-fitting mask when they need to be around others. People in isolation should stay in a specific "sick room" or area and use a separate bathroom if available. Everyone who has presumed or confirmed COVID-19 should stay home and isolate from other people for at least 5 full days (day 0 is the first day of symptoms or the date of the day of the positive viral test for asymptomatic persons). They should wear a mask when around others at home and in public for an additional 5 days. People who are confirmed to have COVID-19 or are showing symptoms of COVID-19 need to isolate regardless of their vaccination status. This includes:  •People who have a positive viral test for COVID-19, regardless of whether or not they have symptoms.      •People with symptoms of COVID-19, including people who are awaiting test results or have not been tested. People with symptoms should isolate even if they do not know if they have been in close contact with someone with COVID-19.      What to do for isolation    •Monitor your symptoms. If you have an emergency warning sign (including trouble breathing), seek emergency medical care immediately.      •Stay in a separate room from other household members, if possible.      •Use a separate bathroom, if possible.      •Take steps to improve ventilation at home, if possible.      •Avoid contact with other members of the household and pets.      •Don't share personal household items, like cups, towels, and utensils.      •Wear a well-fitting mask when you need to be around other people.      Learn more about what to do if you are sick and how to notify your contacts.    Ending isolation for people who had COVID-19 and had symptoms    If you had COVID-19 and had symptoms, isolate for at least 5 days. To calculate your 5-day isolation period, day 0 is your first day of symptoms. Day 1 is the first full day after your symptoms developed. You can leave isolation after 5 full days.  •You can end isolation after 5 full days if you are fever-free for 24 hours without the use of fever-reducing medication and your other symptoms have improved (Loss of taste and smell may persist for weeks or months after recovery and need not delay the end of isolation).      •You should continue to wear a well-fitting mask around others at home and in public for 5 additional days (day 6 through day 10) after the end of your 5-day isolation period. If you are unable to wear a mask when around others, you should continue to isolate for a full 10 days. Avoid people who have weakened immune systems or are more likely to get very sick from COVID-19, and nursing homes and other high-risk settings, until after at least 10 days.      •If you continue to have fever or your other symptoms have not improved after 5 days of isolation, you should wait to end your isolation until you are fever-free for 24 hours without the use of fever-reducing medication and your other symptoms have improved. Continue to wear a well-fitting mask through day 10. Contact your healthcare provider if you have questions.      •See additional information about travel.      •Do not go to places where you are unable to wear a mask, such as restaurants and some gyms, and avoid eating around others at home and at work until a full 10 days after your first day of symptoms.      If an individual has access to a test and wants to test, the best approach is to use an antigen test1 towards the end of the 5-day isolation period. Collect the test sample only if you are fever-free for 24 hours without the use of fever-reducing medication and your other symptoms have improved (loss of taste and smell may persist for weeks or months after recovery and need not delay the end of isolation). If your test result is positive, you should continue to isolate until day 10. If your test result is negative, you can end isolation, but continue to wear a well-fitting mask around others at home and in public until day 10. Follow additional recommendations for masking and avoiding travel as described above.    1As noted in the labeling for authorized over-the counter antigen tests: Negative results should be treated as presumptive. Negative results do not rule out SARS-CoV-2 infection and should not be used as the sole basis for treatment or patient management decisions, including infection control decisions. To improve results, antigen tests should be used twice over a three-day period with at least 24 hours and no more than 48 hours between tests.    Note that these recommendations on ending isolation do not apply to people who are moderately ill or very sick from COVID-19 or have weakened immune systems. See section below for recommendations for when to end isolation for these groups.    Ending isolation for people who tested positive for COVID-19 but had no symptoms    If you test positive for COVID-19 and never develop symptoms, isolate for at least 5 days. Day 0 is the day of your positive viral test (based on the date you were tested) and day 1 is the first full day after the specimen was collected for your positive test. You can leave isolation after 5 full days.  •If you continue to have no symptoms, you can end isolation after at least 5 days.      •You should continue to wear a well-fitting mask around others at home and in public until day 10 (day 6 through day 10). If you are unable to wear a mask when around others, you should continue to isolate for 10 days. Avoid people who have weakened immune systems or are more likely to get very sick from COVID-19, and nursing homes and other high-risk settings, until after at least 10 days.      •If you develop symptoms after testing positive, your 5-day isolation period should start over. Day 0 is your first day of symptoms. Follow the recommendations above for ending isolation for people who had COVID-19 and had symptoms.      •See additional information about travel.      •Do not go to places where you are unable to wear a mask, such as restaurants and some gyms, and avoid eating around others at home and at work until 10 days after the day of your positive test.      If an individual has access to a test and wants to test, the best approach is to use an antigen test1 towards the end of the 5-day isolation period. If your test result is positive, you should continue to isolate until day 10. If your test result is positive, you can also choose to test daily and if your test result is negative, you can end isolation, but continue to wear a well-fitting mask around others at home and in public until day 10. Follow additional recommendations for masking and avoiding travel as described above.    1As noted in the labeling for authorized over-the counter antigen tests: Negative results should be treated as presumptive. Negative results do not rule out SARS-CoV-2 infection and should not be used as the sole basis for treatment or patient management decisions, including infection control decisions. To improve results, antigen tests should be used twice over a three-day period with at least 24 hours and no more than 48 hours between tests.    Ending isolation for people who were moderately or very sick from COVID-19 or have a weakened immune system    People who are moderately ill from COVID-19 (experiencing symptoms that affect the lungs like shortness of breath or difficulty breathing) should isolate for 10 days and follow all other isolation precautions. To calculate your 10-day isolation period, day 0 is your first day of symptoms. Day 1 is the first full day after your symptoms developed. If you are unsure if your symptoms are moderate, talk to a healthcare provider for further guidance.    People who are very sick from COVID-19 (this means people who were hospitalized or required intensive care or ventilation support) and people who have weakened immune systems might need to isolate at home longer. They may also require testing with a viral test to determine when they can be around others. CDC recommends an isolation period of at least 10 and up to 20 days for people who were very sick from COVID-19 and for people with weakened immune systems. Consult with your healthcare provider about when you can resume being around other people. If you are unsure if your symptoms are severe or if you have a weakened immune system, talk to a healthcare provider for further guidance.    People who have a weakened immune system should talk to their healthcare provider about the potential for reduced immune responses to COVID-19 vaccines and the need to continue to follow current prevention measures (including wearing a well-fitting mask and avoiding crowds and poorly ventilated indoor spaces) to protect themselves against COVID-19 until advised otherwise by their healthcare provider. Close contacts of immunocompromised people—including household members—should also be encouraged to receive all recommended COVID-19 vaccine doses to help protect these people.    Isolation in high-risk congregate settings    In certain high-risk congregate settings that have high risk of secondary transmission and where it is not feasible to cohort people (such as correctional and longterm facilities, homeless shelters, and cruise ships), CDC recommends a 10-day isolation period for residents. During periods of critical staffing shortages, facilities may consider shortening the isolation period for staff to ensure continuity of operations. Decisions to shorten isolation in these settings should be made in consultation with state, local, [place], or territorial health departments and should take into consideration the context and characteristics of the facility. CDC's setting-specific guidance provides additional recommendations for these settings.    This CDC guidance is meant to supplement—not replace—any federal, state, local, territorial, or [place] health and safety laws, rules, and regulations.      Recommendations for specific settings    These recommendations do not apply to healthcare professionals. For guidance specific to these settings, see  •Healthcare professionals: Interim Guidance for Managing Healthcare Personnel with SARS-CoV-2 Infection or Exposure to SARS-CoV-2      •Patients, residents, and visitors to healthcare settings: Interim Infection Prevention and Control Recommendations for Healthcare Personnel During the Coronavirus Disease 2019 (COVID-19) Pandemic      Additional setting-specific guidance and recommendations are available.  •These recommendations on quarantine and isolation do apply to [employer] settings. Additional guidance is available here: Overview of COVID-19 Quarantine for [facility]      •Travelers: Travel information and recommendations      •Congregate facilities and other settings: guidance pages for community, work, and school settings        Ongoing COVID-19 exposure FAQs    I live with someone with COVID-19, but I cannot be  from them. How do we manage quarantine in this situation?     It is very important for people with COVID-19 to remain apart from other people, if possible, even if they are living together. If separation of the person with COVID-19 from others that they live with is not possible, the other people that they live with will have ongoing exposure, meaning they will be repeatedly exposed until that person is no longer able to spread the virus to other people. In this situation, there are precautions you can take to limit the spread of COVID-19:  •The person with COVID-19 and everyone they live with should wear a well-fitting mask inside the home.      •If possible, one person should care for the person with COVID-19 to limit the number of people who are in close contact with the infected person.    •Take steps to protect yourself and others to reduce transmission in the home:  •Quarantine if you are not up to date with your COVID-19 vaccines.      •Isolate if you are sick or tested positive for COVID-19, even if you don't have symptoms.      •Learn more about the public health recommendations for testing, mask use and quarantine of close contacts, like yourself, who have ongoing exposure. These recommendations differ depending on your vaccination status.        What should I do if I have ongoing exposure to COVID-19 from someone I live with?    Recommendations for this situation depend on your vaccination status:    If you are not up to date on COVID-19 vaccines and have ongoing exposure to COVID-19, you should:  •Begin quarantine immediately and continue to quarantine throughout the isolation period of the person with COVID-19.      •Continue to quarantine for an additional 5 days starting the day after the end of isolation for the person with COVID-19.    •Get tested at least 5 days after the end of isolation of the infected person that lives with them.   •If you test negative, you can leave the home but should continue to wear a well-fitting mask when around others at home and in public until 10 days after the end of isolation for the person with COVID-19.         •Isolate immediately if you develop symptoms of COVID-19 or test positive.      If you are up to date with COVID-19 vaccines and have ongoing exposure to COVID-19, you should:  •Get tested at least 5 days after your first exposure. A person with COVID-19 is considered infectious starting 2 days before they develop symptoms, or 2 days before the date of their positive test if they do not have symptoms.      •Get tested again at least 5 days after the end of isolation for the person with COVID-19.      •Wear a well-fitting mask when you are around the person with COVID-19, and do this throughout their isolation period.      •Wear a well-fitting mask around others for 10 days after the infected person's isolation period ends.      Isolate immediately if you develop symptoms of COVID-19 or test positive.    What should I do if multiple people I live with test positive for COVID-19 at different times?    Recommendations for this situation depend on your vaccination status:•If you are not up to date with your COVID-19 vaccines, you should:  •Quarantine throughout the isolation period of any infected person that you live with.      •Continue to quarantine until 5 days after the end of isolation date for the most recently infected person that lives with you. For example, if the last day of isolation of the person most recently infected with COVID-19 was June 30, the new 5-day quarantine period starts on July 1.      •Get tested at least 5 days after the end of isolation for the most recently infected person that lives with you.      •Wear a well-fitting mask when you are around any person with COVID-19 while that person is in isolation.      •Wear a well-fitting mask when you are around other people until 10 days after your last close contact.      •Isolate immediately if you develop symptoms of COVID-19 or test positive.      •If you are up to date with your COVID-19 vaccines, you should:  •Get tested at least 5 days after your first exposure. A person with COVID-19 is considered infectious starting 2 days before they developed symptoms, or 2 days before the date of their positive test if they do not have symptoms.      •Get tested again at least 5 days after the end of isolation for the most recently infected person that lives with you.      •Wear a well-fitting mask when you are around any person with COVID-19 while that person is in isolation.      •Wear a well-fitting mask around others for 10 days after the end of isolation for the most recently infected person that lives with you. For example, if the last day of isolation for the person most recently infected with COVID-19 was June 30, the new 10-day period to wear a well-fitting mask indoors in public starts on July 1.      •Isolate immediately if you develop symptoms of COVID-19 or test positive.        I had COVID-19 and completed isolation. Do I have to quarantine or get tested if someone I live with gets COVID-19 shortly after I completed isolation?    No. If you recently completed isolation and someone that lives with you tests positive for the virus that causes COVID-19 shortly after the end of your isolation period, you do not have to quarantine or get tested as long as you do not develop new symptoms. Once all of the people that live together have completed isolation or quarantine, refer to the guidance below for new exposures to COVID-19.•If you had COVID-19 in the previous 90 days and then came into close contact with someone with COVID-19, you do not have to quarantine or get tested if you do not have symptoms. But you should:  •Wear a well-fitting mask indoors in public for 10 days after your last close contact.      •Monitor for COVID-19 symptoms for 10 days from the date of your last close contact.      •Isolate immediately and get tested if symptoms develop.        •If more than 90 days have passed since your recovery from infection, follow CDC's recommendations for close contacts. These recommendations will differ depending on your vaccination status.      03/30/2022    Content source: National Center for Immunization and Respiratory Diseases (NCIRD), Division of Viral Diseases    This information is not intended to replace advice given to you by your health care provider. Make sure you discuss any questions you have with your health care provider.      Document Revised: 05/05/2022 Document Reviewed: 05/05/2022    [employer] Patient Education © 2022 [employer]

## 2022-07-04 NOTE — ED PROVIDER NOTE - OBJECTIVE STATEMENT
31 male presents to ER states he developed cold like symptoms over the weekend, cough, headache, throat pain, getting worse last night, states his home test is positive for covid and that his girlfriend also tested postive.

## 2022-07-04 NOTE — ED ADULT TRIAGE NOTE - CHIEF COMPLAINT QUOTE
pt ambulatory to triage a&ox4 with complaints of headache, body aches and chills. tested positive for covid today. afebrile. o2 sat WNL

## 2022-07-04 NOTE — ED ADULT NURSE NOTE - SUICIDE SCREENING QUESTION 2
Reviewed chart and spoke with pt about discharge needs/plans. Pt lives with her  and 3 children. PTA she was totally independent and drives. She states  able to assist her as needed. She has a PCP and insurance that covers medications. Denies any needs at this time. CM available if any needs arise. Patient unable to complete

## 2022-07-04 NOTE — ED PROVIDER NOTE - PATIENT PORTAL LINK FT
You can access the FollowMyHealth Patient Portal offered by [facility] by registering at the following website: [email or web address]. By joining [facility]’s FollowMyHealth portal, you will also be able to view your health information using other applications (apps) compatible with our system.

## 2022-07-04 NOTE — ED ADULT NURSE NOTE - CAS EDN DISCHARGE INTERVENTIONS
Patient is alert and oriented x4. Room air. Vital signs within normal range. CPAP when sleeping. Tele. Accucheck. NPO since midnight for scheduled surgery at 1320. Consents signed and on chart. Non-weightbearing. PT/OT evaluations after surgical intervention completed. Discussed plan of care and goals for the day with patient. Social work to discuss care options with patient post-surgery. IV discontinued, cath removed intact

## 2022-09-26 NOTE — H&P PST ADULT - PATIENT'S SEXUAL ORIENTATION
Airway  Performed by: Zenon Andres MD  Authorized by: Zenon Andres MD     Final Airway Type:  Endotracheal airway  Final Endotracheal Airway*:  ETT  ETT Size (mm)*:  7.0  Cuff*:  Regular  Technique Used for Successful ETT Placement:  Direct laryngoscopy  Devices/Methods Used in Placement*:  Mask  Intubation Procedure*:  Preoxygenation, ETCO2, Atraumatic, Dentition Unchanged and Pharynx Clear  Insertion Site:  Oral  Blade Type*:  Clayton  Blade Size*:  2  Cuff Volume (mL):  9  Measured from*:  Lips  Secured at (cm)*:  22  Placement Verified by: auscultation, capnometry and equal breath sounds    Glottic View*:  1 - full view of glottis  Attempts*:  1  Number of Other Approaches Attempted:  0  Location:  OR  Urgency:  Elective  Difficult Airway: No    Indications for Airway Management:  Anesthesia and airway protection  Spontaneous Ventilation: absent    Sedation Level:  Anesthetized  Mask Difficulty Assessment:  2 - vent by mask + OA or adjuvant +/- NMBA  Performed By:  Anesthesiologist  Anesthesiologist:  Zenon Andres MD  Start Time: 9/26/2022 12:57 PM     Heterosexual

## 2025-06-30 ENCOUNTER — EMERGENCY (EMERGENCY)
Facility: HOSPITAL | Age: 35
LOS: 1 days | End: 2025-06-30
Attending: EMERGENCY MEDICINE | Admitting: EMERGENCY MEDICINE
Payer: COMMERCIAL

## 2025-06-30 VITALS
HEART RATE: 90 BPM | SYSTOLIC BLOOD PRESSURE: 130 MMHG | TEMPERATURE: 98 F | RESPIRATION RATE: 18 BRPM | DIASTOLIC BLOOD PRESSURE: 80 MMHG | OXYGEN SATURATION: 97 %

## 2025-06-30 VITALS
TEMPERATURE: 98 F | RESPIRATION RATE: 19 BRPM | HEART RATE: 98 BPM | DIASTOLIC BLOOD PRESSURE: 94 MMHG | HEIGHT: 69 IN | WEIGHT: 154.98 LBS | SYSTOLIC BLOOD PRESSURE: 143 MMHG | OXYGEN SATURATION: 97 %

## 2025-06-30 DIAGNOSIS — Z98.890 OTHER SPECIFIED POSTPROCEDURAL STATES: Chronic | ICD-10-CM

## 2025-06-30 LAB
ANION GAP SERPL CALC-SCNC: 6 MMOL/L — SIGNIFICANT CHANGE UP (ref 5–17)
BUN SERPL-MCNC: 14 MG/DL — SIGNIFICANT CHANGE UP (ref 7–23)
CALCIUM SERPL-MCNC: 9.3 MG/DL — SIGNIFICANT CHANGE UP (ref 8.5–10.1)
CHLORIDE SERPL-SCNC: 105 MMOL/L — SIGNIFICANT CHANGE UP (ref 96–108)
CO2 SERPL-SCNC: 24 MMOL/L — SIGNIFICANT CHANGE UP (ref 22–31)
CREAT SERPL-MCNC: 1.2 MG/DL — SIGNIFICANT CHANGE UP (ref 0.5–1.3)
EGFR: 81 ML/MIN/1.73M2 — SIGNIFICANT CHANGE UP
EGFR: 81 ML/MIN/1.73M2 — SIGNIFICANT CHANGE UP
GLUCOSE SERPL-MCNC: 114 MG/DL — HIGH (ref 70–99)
HCT VFR BLD CALC: 47.7 % — SIGNIFICANT CHANGE UP (ref 39–50)
HGB BLD-MCNC: 16.2 G/DL — SIGNIFICANT CHANGE UP (ref 13–17)
MCHC RBC-ENTMCNC: 30.1 PG — SIGNIFICANT CHANGE UP (ref 27–34)
MCHC RBC-ENTMCNC: 34 G/DL — SIGNIFICANT CHANGE UP (ref 32–36)
MCV RBC AUTO: 88.7 FL — SIGNIFICANT CHANGE UP (ref 80–100)
NRBC # BLD AUTO: 0 K/UL — SIGNIFICANT CHANGE UP (ref 0–0)
NRBC # FLD: 0 K/UL — SIGNIFICANT CHANGE UP (ref 0–0)
NRBC BLD AUTO-RTO: 0 /100 WBCS — SIGNIFICANT CHANGE UP (ref 0–0)
PLATELET # BLD AUTO: 204 K/UL — SIGNIFICANT CHANGE UP (ref 150–400)
PMV BLD: 10.5 FL — SIGNIFICANT CHANGE UP (ref 7–13)
POTASSIUM SERPL-MCNC: 4.3 MMOL/L — SIGNIFICANT CHANGE UP (ref 3.5–5.3)
POTASSIUM SERPL-SCNC: 4.3 MMOL/L — SIGNIFICANT CHANGE UP (ref 3.5–5.3)
RBC # BLD: 5.38 M/UL — SIGNIFICANT CHANGE UP (ref 4.2–5.8)
RBC # FLD: 13.3 % — SIGNIFICANT CHANGE UP (ref 10.3–14.5)
S PYO DNA THROAT QL NAA+PROBE: SIGNIFICANT CHANGE UP
SODIUM SERPL-SCNC: 135 MMOL/L — SIGNIFICANT CHANGE UP (ref 135–145)
WBC # BLD: 15.12 K/UL — HIGH (ref 3.8–10.5)
WBC # FLD AUTO: 15.12 K/UL — HIGH (ref 3.8–10.5)

## 2025-06-30 PROCEDURE — 85027 COMPLETE CBC AUTOMATED: CPT

## 2025-06-30 PROCEDURE — 87798 DETECT AGENT NOS DNA AMP: CPT

## 2025-06-30 PROCEDURE — 96375 TX/PRO/DX INJ NEW DRUG ADDON: CPT

## 2025-06-30 PROCEDURE — 99284 EMERGENCY DEPT VISIT MOD MDM: CPT | Mod: 25

## 2025-06-30 PROCEDURE — 96374 THER/PROPH/DIAG INJ IV PUSH: CPT

## 2025-06-30 PROCEDURE — 87799 DETECT AGENT NOS DNA QUANT: CPT

## 2025-06-30 PROCEDURE — 99284 EMERGENCY DEPT VISIT MOD MDM: CPT

## 2025-06-30 PROCEDURE — 80048 BASIC METABOLIC PNL TOTAL CA: CPT

## 2025-06-30 PROCEDURE — 36415 COLL VENOUS BLD VENIPUNCTURE: CPT

## 2025-06-30 PROCEDURE — 87651 STREP A DNA AMP PROBE: CPT

## 2025-06-30 RX ORDER — DEXAMETHASONE 0.5 MG/1
10 TABLET ORAL ONCE
Refills: 0 | Status: COMPLETED | OUTPATIENT
Start: 2025-06-30 | End: 2025-06-30

## 2025-06-30 RX ORDER — KETOROLAC TROMETHAMINE 30 MG/ML
30 INJECTION, SOLUTION INTRAMUSCULAR; INTRAVENOUS ONCE
Refills: 0 | Status: DISCONTINUED | OUTPATIENT
Start: 2025-06-30 | End: 2025-06-30

## 2025-06-30 RX ADMIN — KETOROLAC TROMETHAMINE 30 MILLIGRAM(S): 30 INJECTION, SOLUTION INTRAMUSCULAR; INTRAVENOUS at 13:42

## 2025-06-30 RX ADMIN — Medication 1000 MILLILITER(S): at 13:42

## 2025-06-30 RX ADMIN — DEXAMETHASONE 102 MILLIGRAM(S): 0.5 TABLET ORAL at 14:04

## 2025-06-30 NOTE — ED ADULT NURSE NOTE - CAS ELECT INFOMATION PROVIDED
Patient : Didi MASTERSON Age: 60 year old Sex: female   MRN: [id number] Encounter Date: 6/20/2023      History     Chief Complaint   Patient presents with   • Abdominal Pain   • Vomiting     Patient is a 60 year old female with a PMHx of malignant neoplasm and uterine leiomyoma who is presenting for evaluation of left flank pain, with accompanied nausea and vomiting onset 0830 today. She reports taking her hypertensive medication at 0630 prior to symptom onset. She states symptoms subsided until 1430 today after having jalapeno humus with chips for lunch at 1330. Patient denies fevers, hematuria or dysuria. She endorses constipation and states she took metamucil last night to no relief. Patient currently rates her flank pain 1/10. She denies a history of kidney stones, and her surgical history includes a full hysterectomy. She was recently taken off her cholesterol medication after experiencing an allergic reaction. Patient reports being scheduled for a colonoscopy 8/2023 and endorses previously being told she may have diverticulosis.     The patient denies any current chest pain, dyspnea, cough, fever, chills, diaphoresis, diarrhea, back pain, neck pain, extremity pain, bladder/bowel dysfunction, headache, dizziness, numbness/tingling, weakness, or any other complaints.          Allergies   Allergen Reactions   • Lisinopril Other (See Comments)     Tongue scalloping/ heaviness   • Lovastatin MYALGIA       Current Discharge Medication List      Prior to Admission Medications    Details   fluocinonide (LIDEX) 0.05 % topical solution APPLY EVERY DAY AS NEEDED FOR FLARE UPS FOR 10 DAYS      ketoconazole (NIZORAL) 2 % shampoo       losartan (COZAAR) 50 MG tablet Take 1 tablet by mouth at bedtime.  Qty: 90 tablet, Refills: 3      cetirizine (ZyrTEC) 10 MG tablet Take 10 mg by mouth daily.      Multiple Vitamins-Calcium (Essential One Daily Multivit) Tab       Wheat Dextrin (BENEFIBER PO)          New Prescriptions     Details   ibuprofen (MOTRIN) 800 MG tablet Take 1 tablet by mouth 3 times daily as needed for Pain.  Qty: 30 tablet, Refills: 0             Past Medical History:   Diagnosis Date   • Malignant neoplasm (CMD)     in a colon polyp 2010 Dr Garland   • Uterine leiomyoma 9/11/2014       Past Surgical History:   Procedure Laterality Date   • HYSTERECTOMY  10/2020    Dr Bowers, no cancer 2/2 fibroids       Family History   Problem Relation Age of Onset   • Hypertension Mother    • Cancer, Breast Father    • Hypertension Father    • Diabetes Father    • Parkinsonism Father    • Leukemia Maternal Aunt    • Cancer, Colon Neg Hx    • Cancer, Ovarian Neg Hx        Social History     Tobacco Use   • Smoking status: Never   • Smokeless tobacco: Never   Vaping Use   • Vaping Use: never used   Substance Use Topics   • Alcohol use: Not Currently   • Drug use: Never       Review of Systems   Constitutional: Negative.    HENT: Negative.    Eyes: Negative.    Respiratory: Negative.    Cardiovascular: Negative.    Gastrointestinal: Positive for nausea and vomiting.   Endocrine: Negative.    Genitourinary: Positive for flank pain.   Skin: Negative.    Allergic/Immunologic: Negative.    Neurological: Negative.    Hematological: Negative.    Psychiatric/Behavioral: Negative.        Physical Exam     ED Triage Vitals [06/20/23 1816]   ED Triage Vitals Group      Temp 98.4 °F (36.9 °C)      Heart Rate 68      Resp 16      /81      SpO2 100 %      EtCO2 mmHg       Height       Weight       Weight Scale Used       BMI (Calculated)       IBW/kg (Calculated)        Physical Exam  Vitals reviewed.   Constitutional:       General: She is not in acute distress.     Appearance: Normal appearance.   HENT:      Head: Normocephalic and atraumatic.      Right Ear: Tympanic membrane normal.      Left Ear: Tympanic membrane normal.      Mouth/Throat:      Mouth: Mucous membranes are moist.      Pharynx: Oropharynx is clear.      Neck: Normal range  of motion.   Eyes:      Extraocular Movements: Extraocular movements intact.      Pupils: Pupils are equal, round, and reactive to light.   Cardiovascular:      Rate and Rhythm: Normal rate and regular rhythm.   Pulmonary:      Effort: Pulmonary effort is normal. No respiratory distress.      Breath sounds: Normal breath sounds.   Abdominal:      General: Bowel sounds are normal. There is no distension.      Palpations: Abdomen is soft.   Musculoskeletal:         General: No swelling or tenderness. Normal range of motion.      Right lower leg: No edema.      Left lower leg: No edema.   Skin:     General: Skin is warm and dry.      Coloration: Skin is not pale.      Findings: No rash.   Neurological:      General: No focal deficit present.      Mental Status: She is alert and oriented to person, place, and time. Mental status is at baseline.      Cranial Nerves: No cranial nerve deficit.         ED Course     Procedures    Lab Results     Results for orders placed or performed during the hospital encounter of 06/20/23   Comprehensive Metabolic Panel   Result Value Ref Range    Fasting Status      Sodium 138 135 - 145 mmol/L    Potassium 3.4 3.4 - 5.1 mmol/L    Chloride 102 97 - 110 mmol/L    Carbon Dioxide 26 21 - 32 mmol/L    Anion Gap 13 7 - 19 mmol/L    Glucose 133 (H) 70 - 99 mg/dL    BUN 17 6 - 20 mg/dL    Creatinine 0.88 0.51 - 0.95 mg/dL    Glomerular Filtration Rate 75 >=60    BUN/Cr 19 7 - 25    Calcium 9.2 8.4 - 10.2 mg/dL    Bilirubin, Total 0.2 0.2 - 1.0 mg/dL    GOT/AST 21 <=37 Units/L    GPT/ALT 35 <64 Units/L    Alkaline Phosphatase 81 45 - 117 Units/L    Albumin 3.7 3.6 - 5.1 g/dL    Protein, Total 7.4 6.4 - 8.2 g/dL    Globulin 3.7 2.0 - 4.0 g/dL    A/G Ratio 1.0 1.0 - 2.4   Lipase   Result Value Ref Range    Lipase 80 73 - 393 Units/L   Urinalysis & Reflex Microscopy With Culture If Indicated   Result Value Ref Range    COLOR, URINALYSIS Yellow     APPEARANCE, URINALYSIS Cloudy     GLUCOSE,  URINALYSIS Negative Negative mg/dL    BILIRUBIN, URINALYSIS Negative Negative    KETONES, URINALYSIS 40 (A) Negative mg/dL    SPECIFIC GRAVITY, URINALYSIS 1.019 1.005 - 1.030    OCCULT BLOOD, URINALYSIS Large (A) Negative    PH, URINALYSIS 7.0 5.0 - 7.0    PROTEIN, URINALYSIS Trace (A) Negative mg/dL    UROBILINOGEN, URINALYSIS 0.2 0.2, 1.0 mg/dL    NITRITE, URINALYSIS Negative Negative    LEUKOCYTE ESTERASE, URINALYSIS Small (A) Negative    SQUAMOUS EPITHELIAL, URINALYSIS None Seen None Seen, 1 to 5 /hpf    ERYTHROCYTES, URINALYSIS >100 (A) None Seen, 1 to 2 /hpf    LEUKOCYTES, URINALYSIS 1 to 5 None Seen, 1 to 5 /hpf    BACTERIA, URINALYSIS None Seen None Seen /hpf    HYALINE CASTS, URINALYSIS None Seen None Seen, 1 to 5 /lpf    MUCUS Present    CBC with Automated Differential (performable only)   Result Value Ref Range    WBC 8.6 4.2 - 11.0 K/mcL    RBC 4.45 4.00 - 5.20 mil/mcL    HGB 12.0 12.0 - 15.5 g/dL    HCT 36.9 36.0 - 46.5 %    MCV 82.9 78.0 - 100.0 fl    MCH 27.0 26.0 - 34.0 pg    MCHC 32.5 32.0 - 36.5 g/dL    RDW-CV 14.5 11.0 - 15.0 %    RDW-SD 43.9 39.0 - 50.0 fL     140 - 450 K/mcL    NRBC 0 <=0 /100 WBC    Neutrophil, Percent 66 %    Lymphocytes, Percent 28 %    Mono, Percent 6 %    Eosinophils, Percent 0 %    Basophils, Percent 0 %    Immature Granulocytes 0 %    Absolute Neutrophils 5.6 1.8 - 7.7 K/mcL    Absolute Lymphocytes 2.4 1.0 - 4.0 K/mcL    Absolute Monocytes 0.5 0.3 - 0.9 K/mcL    Absolute Eosinophils  0.0 0.0 - 0.5 K/mcL    Absolute Basophils 0.0 0.0 - 0.3 K/mcL    Absolute Immature Granulocytes 0.0 0.0 - 0.2 K/mcL       Radiology Results     Imaging Results          CT ABDOMEN PELVIS FOR KIDNEY STONES WO CONTRAST (Final result)  Result time 06/20/23 23:26:17    Final result                 Impression:      1.    A tiny 3 mm stone which has passed to the ureter and is seated within  urinary bladder.  Mild residual left-sided hydronephrosis and inflammation.     2.   A 2 mm  nonobstructing stone in the mid left kidney.    3.   Mild diverticulosis.        Electronically Signed by: DUC JURADO MD   Signed on: 6/20/2023 11:26 PM   Workstation ID: [id number]             Narrative:    CT ABDOMEN AND PELVIS WITHOUT CONTRAST: 6/20/2023 10:14 PM    CLINICAL HISTORY: 60 years of age, Female, Flank pain, kidney stone  suspected.    COMPARISON: CT abdomen pelvis dated 10/22/2017.    PROCEDURE COMMENTS: CT of the abdomen and pelvis was performed without IV  or oral contrast.    FINDINGS:    Lower thorax: Normal.     Liver and biliary tree: Liver is normal in size and morphology.  A few  small hypodensities the right hepatic lobe, which are too small to  characterize, but likely represent cysts.  No biliary duct dilatation.    Gallbladder: Normal.    Spleen: Normal noncontrast appearance.    Pancreas: Normal noncontrast appearance.    Adrenal glands: Normal noncontrast appearance.    Kidneys and ureters: There is mild left-sided hydronephrosis with minimal  perinephric stranding.  There is a 2 mm stone in the mid, left kidney.    Gastrointestinal tract: Scattered colonic diverticulosis with no changes of  acute diverticulitis.  Appendix is normal.    Peritoneal cavity: No free fluid.    Bladder: Tiny 3 mm stone in the dependent portion of the mid bladder.    Uterus and ovaries: Uterus surgically absent.  No adnexal masses.    Vasculature: Normal noncontrast appearance.    Lymph nodes: Normal.    Abdominal wall: Normal.    Musculoskeletal: Moderate degenerative disc disease at L5-S1.                                ED Medication Orders (From admission, onward)    Ordered Start     Status Ordering Provider    06/20/23 2323 06/20/23 2326  sodium chloride (NORMAL SALINE) 0.9 % bolus 1,000 mL  ONCE         Last MAR action: New NATALIE Lopes               MDM     Patient is a 60 year old female with a PMHx of malignant neoplasm and uterine leiomyoma who is presenting for evaluation of left  flank pain, with accompanied nausea and vomiting onset 0830 today    Patient with labs that demonstrate RBCs in urine, with her flank pain, concerning for kidney stone.     Patient with CT kidney stone with 3mm stone that is now in the bladder, patient is now pain free.     Discussed results with the patient. She was discharged home in stable condition, given Return Precautions to the ED.     Clinical Impression     ED Diagnosis   1. Kidney stone on left side            Disposition        Discharge 6/21/2023 12:12 AM  Didi ZIMMER-SHO discharge to home/self care.         I, Jean Carlos Willis, accurately documented the service(s) personally performed and the decisions made by Dr. Vasquez.    The documentation recorded by the scribe accurately and completely reflects the service(s) I personally performed and the decisions made by me.          Jody Vasquez MD  06/21/23 0018     DC instructions

## 2025-06-30 NOTE — ED PROVIDER NOTE - PROGRESS NOTE DETAILS
pt reevaluated, feeling better, able to speak and swallow better after fluids and dex, most likely pt has mono, follow up ebv test, continue abx as prescribed by ent and return If any symptoms worsen

## 2025-06-30 NOTE — ED PROVIDER NOTE - THROAT FINDINGS
OROPHARYNGEAL EXUDATE/THROAT RED/uvula midline/UVULA EDEMATOUS/no vesicles/NO DROOLING/NO TONGUE ELEVATION

## 2025-06-30 NOTE — ED PROVIDER NOTE - PATIENT PORTAL LINK FT
You can access the FollowMyHealth Patient Portal offered by [facility] by registering at the following website: [email or web address]. By joining [employer]’s FollowMyHealth portal, you will also be able to view your health information using other applications (apps) compatible with our system.

## 2025-06-30 NOTE — ED ADULT TRIAGE NOTE - NS ED NURSE BANDS TYPE
PT arrived via ambulance from a residential treatment center. PT c/o contractions. Urine sample obtained and pt placed on EFM. IV of NS infusing on arrival started by EMS. PT history obtained from patient. PT states contractions have decreased substantially since IV. PT admits to only having 2 cups of coffee today for hydration. Dr Rose contacted and orders received.  Labs sent. Dr Rose notified of FHT and lab results. ORders to discharge to home. PT was transported back to treatment center by her . PT verbalizes understanding of discharge instructions and when to call.  
Name band;

## 2025-06-30 NOTE — ED ADULT TRIAGE NOTE - CHIEF COMPLAINT QUOTE
Pt. states sore throat, difficulty swallowing, fever for about one week and on multiple abx, has worsened in last few days.

## 2025-06-30 NOTE — ED PROVIDER NOTE - OBJECTIVE STATEMENT
35yo male who presents  with sore throat, fever, chills for the last 4 days, started with sore throat and chills, was seen at urgent care and started on prednisone and abx, then followed up with ent who sent the pt for mono test to be done today, no strep test in the office and pt has been vomiting temp to 101 no sob no drooling or change in voice, just feeling weak because he is unable to drink or eat

## 2025-06-30 NOTE — ED PROVIDER NOTE - MDM ORDERS SUBMITTED SELECTION
Bedside shift change report given to Yuko RN (oncoming nurse) Mai Brody RN (offgoing nurse).   Report included the following information         Shift worked:  days   Shift summary and any significant changes:     Pt to be dc'd to [facility], should be picked up this evening by [facility]   Pt had a small BM today      Concerns for physician to address:     Zone phone for oncoming shift:         Patient Information  Laurie Hansen  17 y.o.  5/23/2021 12:47 PM by Maureen Sam MD. Clair Brooks was admitted from home  Problem List          Patient Active Problem List     Diagnosis Date Noted    Slurred speech 05/23/2021    Weakness 04/01/2021    Multiple sclerosis ([address] 03/31/2021    Non compliance with medical treatment 02/07/2021    Recurrent falls 02/07/2021    Altered mental status 02/06/2021    Leukopenia 11/25/2020    UTI (urinary tract infection) 11/25/2020    Multiple sclerosis exacerbation ([address] 08/06/2020    Facial droop 03/23/2020    Exacerbation of multiple sclerosis ([address] 03/09/2020    Left-sided weakness 03/08/2020    Severe obesity ([address] 10/03/2018    Constipation 07/17/2015    Body aches 12/11/2014    Back pain 09/07/2012    Fibromyalgia 08/17/2012    MS (multiple sclerosis) ([address] 08/17/2012    Decreased hearing 01/31/2011    Acute pharyngitis 01/26/2011    Anxiety 08/25/2010    Blood pressure elevated 08/25/2010    Tobacco abuse 08/25/2010              Past Medical History:   Diagnosis Date    Body aches 12/11/2014    Chronic pain      Depression      Headache(784.0)      History of mammogram never before    MS (multiple sclerosis) ([address]      Neurological disorder       Multiple Sclerosis    Pap smear for cervical cancer screening 2008    Psychiatric disorder       anxiety    Psychotic disorder ([address]           Core Measures:  CVA: N  CHF:N        Activity:  Activity Level: Bed Rest  Number times ambulated in hallways past shift: 0  Number of times OOB to chair past shift:0     Cardiac:   Cardiac Monitoring:Y      Access:   Current line(s): peripheral line        Genitourinary:   Urinary status: voiding/ external catheter  Urinary Catheter? No  GI:  Current diet:  DIET DIABETIC CONSISTENT CARB Regular  DIET ONE TIME MESSAGE  Passing flatus:    Tolerating current diet:Y     Pain Management:   Patient states pain is manageable on current regimen: Y     Skin:  Samy Score: 14  Interventions: turn    Patient Safety:  Fall Score: Total Score: 4  Interventions:bed alarm  High Fall Risk: Yes  @Rollbelt  @dexterity to release roll belt  Yes/No ( must document dexterity  here by stating Yes or No here, otherwise this is a restraint and must follow restraint documentation policy.)     DVT prophylaxis:  DVT prophylaxis Med-  DVT prophylaxis SCD or CONG-     Wounds: (If Applicable)           Active Consults:  IP CONSULT TO NEUROLOGY  IP CONSULT TO HOSPITALIST     Length of Stay:  Expected LOS: 3d 0h  Actual LOS: 1  Discharge Plan: to be determined                                                                  Medications

## 2025-06-30 NOTE — ED ADULT NURSE NOTE - ISOLATION AIRBORNE CONTACT OTHER
This RN retrieved pt's home medications from pharmacy and handed them to pt upon discharge from ED        Marie Griffith RN  11/29/22 [id number] r/o viral

## 2025-07-01 LAB
EBV DNA SERPL NAA+PROBE-ACNC: SIGNIFICANT CHANGE UP IU/ML
EBVPCR LOG: SIGNIFICANT CHANGE UP LOG10IU/ML

## 2025-09-16 ENCOUNTER — APPOINTMENT (OUTPATIENT)
Dept: GASTROENTEROLOGY | Facility: CLINIC | Age: 35
End: 2025-09-16

## (undated) DEVICE — SOL IRR POUR H2O 1000ML

## (undated) DEVICE — SOL IRR POUR NS 0.9% 1000ML

## (undated) DEVICE — CATH IV SAFE INSYTE 14G X 1.75" (ORANGE)

## (undated) DEVICE — DRSG STERISTRIPS 0.5 X 4"

## (undated) DEVICE — GLV 7.5 PROTEXIS (WHITE)

## (undated) DEVICE — SUT SURGIPRO II 4-0 18" P-12

## (undated) DEVICE — VENODYNE/SCD SLEEVE CALF LARGE

## (undated) DEVICE — SUT CHROMIC 4-0 18" P-3

## (undated) DEVICE — ELCTR BOVIE PENCIL HANDPIECE

## (undated) DEVICE — VISITEC 4X4

## (undated) DEVICE — DRSG MEROCEL STANDARD NO STRING 8CM X 2CM

## (undated) DEVICE — VENODYNE/SCD SLEEVE CALF MEDIUM

## (undated) DEVICE — PACK NASAL

## (undated) DEVICE — SUT PLAIN GUT 4-0 18" SC-1

## (undated) DEVICE — DRAPE 3/4 SHEET W REINFORCEMENT 56X77"

## (undated) DEVICE — DRAPE TOWEL BLUE 17" X 24"

## (undated) DEVICE — DRSG NASOPORE 8CM FIRM

## (undated) DEVICE — TUBING IV EXTENSION 30"

## (undated) DEVICE — PLV/PSP-ESU T7E14768DX: Type: DURABLE MEDICAL EQUIPMENT

## (undated) DEVICE — SUT POLYSORB 4-0 18" P-12 UNDYED

## (undated) DEVICE — ELCTR BOVIE SUCTION 10FR

## (undated) DEVICE — DRSG MASTISOL

## (undated) DEVICE — WARMING BLANKET LOWER ADULT

## (undated) DEVICE — ELCTR BOVIE TIP NEEDLE INSULATED 2.8" EDGE

## (undated) DEVICE — SUT MONOSOF 4-0 18" P-13 UNDYED

## (undated) DEVICE — Device

## (undated) DEVICE — DRAPE INSTRUMENT POUCH 6.75" X 11"

## (undated) DEVICE — ELCTR BOVIE TIP BLADE INSULATED 2.75" EDGE

## (undated) DEVICE — TUBING SUCTION 20FT

## (undated) DEVICE — SUCTION YANKAUER TAPERED BULBOUS NO VENT

## (undated) DEVICE — SPECIMEN CONTAINER 4OZ